# Patient Record
Sex: FEMALE | Race: WHITE | URBAN - METROPOLITAN AREA
[De-identification: names, ages, dates, MRNs, and addresses within clinical notes are randomized per-mention and may not be internally consistent; named-entity substitution may affect disease eponyms.]

---

## 2017-05-13 ENCOUNTER — HOSPITAL ENCOUNTER (EMERGENCY)
Facility: CLINIC | Age: 32
Discharge: PSYCHIATRIC OR SUBSTANCE USE FACILITY | End: 2017-05-13
Attending: EMERGENCY MEDICINE | Admitting: EMERGENCY MEDICINE
Payer: COMMERCIAL

## 2017-05-13 ENCOUNTER — HOSPITAL ENCOUNTER (INPATIENT)
Facility: CLINIC | Age: 32
LOS: 3 days | Discharge: HOME | DRG: 885 | End: 2017-05-16
Attending: PSYCHIATRY & NEUROLOGY | Admitting: PSYCHIATRY & NEUROLOGY
Payer: COMMERCIAL

## 2017-05-13 VITALS — RESPIRATION RATE: 16 BRPM | WEIGHT: 191 LBS | TEMPERATURE: 97 F | BODY MASS INDEX: 28.95 KG/M2 | HEIGHT: 68 IN

## 2017-05-13 VITALS
TEMPERATURE: 97.8 F | OXYGEN SATURATION: 98 % | HEART RATE: 105 BPM | DIASTOLIC BLOOD PRESSURE: 77 MMHG | SYSTOLIC BLOOD PRESSURE: 95 MMHG | RESPIRATION RATE: 16 BRPM

## 2017-05-13 DIAGNOSIS — T14.91XA SUICIDE ATTEMPT (H): ICD-10-CM

## 2017-05-13 DIAGNOSIS — T50.902A DRUG OVERDOSE, INTENTIONAL SELF-HARM, INITIAL ENCOUNTER (H): ICD-10-CM

## 2017-05-13 DIAGNOSIS — F41.9 ANXIETY: Primary | ICD-10-CM

## 2017-05-13 PROBLEM — F32.A DEPRESSION: Status: ACTIVE | Noted: 2017-05-13

## 2017-05-13 LAB
ALBUMIN SERPL-MCNC: 3.9 G/DL (ref 3.4–5)
ALP SERPL-CCNC: 30 U/L (ref 40–150)
ALT SERPL W P-5'-P-CCNC: 20 U/L (ref 0–50)
AMPHETAMINES UR QL SCN: ABNORMAL
ANION GAP SERPL CALCULATED.3IONS-SCNC: 7 MMOL/L (ref 3–14)
APAP SERPL-MCNC: NORMAL MG/L (ref 10–20)
AST SERPL W P-5'-P-CCNC: 22 U/L (ref 0–45)
BARBITURATES UR QL: ABNORMAL
BASOPHILS # BLD AUTO: 0 10E9/L (ref 0–0.2)
BASOPHILS NFR BLD AUTO: 0.5 %
BENZODIAZ UR QL: ABNORMAL
BILIRUB SERPL-MCNC: 0.5 MG/DL (ref 0.2–1.3)
BUN SERPL-MCNC: 19 MG/DL (ref 7–30)
CALCIUM SERPL-MCNC: 9.4 MG/DL (ref 8.5–10.1)
CANNABINOIDS UR QL SCN: ABNORMAL
CHLORIDE SERPL-SCNC: 108 MMOL/L (ref 94–109)
CO2 SERPL-SCNC: 29 MMOL/L (ref 20–32)
COCAINE UR QL: ABNORMAL
CREAT SERPL-MCNC: 0.88 MG/DL (ref 0.52–1.04)
DIFFERENTIAL METHOD BLD: NORMAL
EOSINOPHIL # BLD AUTO: 0.2 10E9/L (ref 0–0.7)
EOSINOPHIL NFR BLD AUTO: 2.4 %
ERYTHROCYTE [DISTWIDTH] IN BLOOD BY AUTOMATED COUNT: 13.8 % (ref 10–15)
ETHANOL SERPL-MCNC: <0.01 G/DL
GFR SERPL CREATININE-BSD FRML MDRD: 75 ML/MIN/1.7M2
GLUCOSE SERPL-MCNC: 80 MG/DL (ref 70–99)
HCG UR QL: NEGATIVE
HCT VFR BLD AUTO: 40.1 % (ref 35–47)
HGB BLD-MCNC: 13.4 G/DL (ref 11.7–15.7)
IMM GRANULOCYTES # BLD: 0 10E9/L (ref 0–0.4)
IMM GRANULOCYTES NFR BLD: 0.1 %
LYMPHOCYTES # BLD AUTO: 2.5 10E9/L (ref 0.8–5.3)
LYMPHOCYTES NFR BLD AUTO: 32.5 %
MCH RBC QN AUTO: 29.1 PG (ref 26.5–33)
MCHC RBC AUTO-ENTMCNC: 33.4 G/DL (ref 31.5–36.5)
MCV RBC AUTO: 87 FL (ref 78–100)
MONOCYTES # BLD AUTO: 0.6 10E9/L (ref 0–1.3)
MONOCYTES NFR BLD AUTO: 7.4 %
NEUTROPHILS # BLD AUTO: 4.3 10E9/L (ref 1.6–8.3)
NEUTROPHILS NFR BLD AUTO: 57.1 %
NRBC # BLD AUTO: 0 10*3/UL
NRBC BLD AUTO-RTO: 0 /100
OPIATES UR QL SCN: ABNORMAL
PCP UR QL SCN: ABNORMAL
PLATELET # BLD AUTO: 285 10E9/L (ref 150–450)
POTASSIUM SERPL-SCNC: 3.9 MMOL/L (ref 3.4–5.3)
PROT SERPL-MCNC: 7.8 G/DL (ref 6.8–8.8)
RBC # BLD AUTO: 4.6 10E12/L (ref 3.8–5.2)
SODIUM SERPL-SCNC: 144 MMOL/L (ref 133–144)
WBC # BLD AUTO: 7.6 10E9/L (ref 4–11)

## 2017-05-13 PROCEDURE — 80307 DRUG TEST PRSMV CHEM ANLYZR: CPT | Performed by: EMERGENCY MEDICINE

## 2017-05-13 PROCEDURE — 80053 COMPREHEN METABOLIC PANEL: CPT | Performed by: EMERGENCY MEDICINE

## 2017-05-13 PROCEDURE — 81025 URINE PREGNANCY TEST: CPT | Performed by: EMERGENCY MEDICINE

## 2017-05-13 PROCEDURE — 99285 EMERGENCY DEPT VISIT HI MDM: CPT | Mod: 25

## 2017-05-13 PROCEDURE — 25000132 ZZH RX MED GY IP 250 OP 250 PS 637: Performed by: PSYCHIATRY & NEUROLOGY

## 2017-05-13 PROCEDURE — 80329 ANALGESICS NON-OPIOID 1 OR 2: CPT | Performed by: EMERGENCY MEDICINE

## 2017-05-13 PROCEDURE — 90791 PSYCH DIAGNOSTIC EVALUATION: CPT

## 2017-05-13 PROCEDURE — 80320 DRUG SCREEN QUANTALCOHOLS: CPT | Performed by: EMERGENCY MEDICINE

## 2017-05-13 PROCEDURE — 93005 ELECTROCARDIOGRAM TRACING: CPT

## 2017-05-13 PROCEDURE — 12400001 ZZH R&B MH UMMC

## 2017-05-13 PROCEDURE — 85025 COMPLETE CBC W/AUTO DIFF WBC: CPT | Performed by: EMERGENCY MEDICINE

## 2017-05-13 RX ORDER — ACETAMINOPHEN 325 MG/1
650 TABLET ORAL EVERY 4 HOURS PRN
Status: DISCONTINUED | OUTPATIENT
Start: 2017-05-13 | End: 2017-05-16 | Stop reason: HOSPADM

## 2017-05-13 RX ORDER — DEXTROAMPHETAMINE SACCHARATE, AMPHETAMINE ASPARTATE MONOHYDRATE, DEXTROAMPHETAMINE SULFATE AND AMPHETAMINE SULFATE 7.5; 7.5; 7.5; 7.5 MG/1; MG/1; MG/1; MG/1
60 CAPSULE, EXTENDED RELEASE ORAL DAILY
Status: ON HOLD | COMMUNITY
End: 2017-12-11

## 2017-05-13 RX ORDER — ALUMINA, MAGNESIA, AND SIMETHICONE 2400; 2400; 240 MG/30ML; MG/30ML; MG/30ML
30 SUSPENSION ORAL EVERY 4 HOURS PRN
Status: DISCONTINUED | OUTPATIENT
Start: 2017-05-13 | End: 2017-05-16 | Stop reason: HOSPADM

## 2017-05-13 RX ORDER — TRAZODONE HYDROCHLORIDE 50 MG/1
50 TABLET, FILM COATED ORAL
Status: DISCONTINUED | OUTPATIENT
Start: 2017-05-13 | End: 2017-05-16 | Stop reason: HOSPADM

## 2017-05-13 RX ORDER — BISACODYL 10 MG
10 SUPPOSITORY, RECTAL RECTAL DAILY PRN
Status: DISCONTINUED | OUTPATIENT
Start: 2017-05-13 | End: 2017-05-16 | Stop reason: HOSPADM

## 2017-05-13 RX ORDER — OLANZAPINE 10 MG/1
10 TABLET ORAL
Status: DISCONTINUED | OUTPATIENT
Start: 2017-05-13 | End: 2017-05-16 | Stop reason: HOSPADM

## 2017-05-13 RX ORDER — ALBUTEROL SULFATE 90 UG/1
1-2 AEROSOL, METERED RESPIRATORY (INHALATION) EVERY 4 HOURS PRN
Status: DISCONTINUED | OUTPATIENT
Start: 2017-05-13 | End: 2017-05-16 | Stop reason: HOSPADM

## 2017-05-13 RX ORDER — NICOTINE 21 MG/24HR
1 PATCH, TRANSDERMAL 24 HOURS TRANSDERMAL DAILY
Status: DISCONTINUED | OUTPATIENT
Start: 2017-05-14 | End: 2017-05-16 | Stop reason: HOSPADM

## 2017-05-13 RX ORDER — HYDROXYZINE HYDROCHLORIDE 25 MG/1
25-50 TABLET, FILM COATED ORAL EVERY 4 HOURS PRN
Status: DISCONTINUED | OUTPATIENT
Start: 2017-05-13 | End: 2017-05-16 | Stop reason: HOSPADM

## 2017-05-13 RX ORDER — PHENOBARBITAL 32.4 MG/1
64.8 TABLET ORAL 2 TIMES DAILY
Status: DISCONTINUED | OUTPATIENT
Start: 2017-05-13 | End: 2017-05-15

## 2017-05-13 RX ORDER — MIRTAZAPINE 30 MG/1
30 TABLET, FILM COATED ORAL AT BEDTIME
Status: DISCONTINUED | OUTPATIENT
Start: 2017-05-13 | End: 2017-05-16 | Stop reason: HOSPADM

## 2017-05-13 RX ORDER — OLANZAPINE 10 MG/2ML
10 INJECTION, POWDER, FOR SOLUTION INTRAMUSCULAR
Status: DISCONTINUED | OUTPATIENT
Start: 2017-05-13 | End: 2017-05-16 | Stop reason: HOSPADM

## 2017-05-13 RX ORDER — ARIPIPRAZOLE 10 MG/1
10 TABLET ORAL AT BEDTIME
Status: DISCONTINUED | OUTPATIENT
Start: 2017-05-13 | End: 2017-05-16 | Stop reason: HOSPADM

## 2017-05-13 RX ORDER — ZOLPIDEM TARTRATE 5 MG/1
10 TABLET ORAL
Status: DISCONTINUED | OUTPATIENT
Start: 2017-05-13 | End: 2017-05-16 | Stop reason: HOSPADM

## 2017-05-13 RX ADMIN — MIRTAZAPINE 30 MG: 30 TABLET, FILM COATED ORAL at 23:03

## 2017-05-13 RX ADMIN — PHENOBARBITAL 64.8 MG: 32.4 TABLET ORAL at 23:03

## 2017-05-13 RX ADMIN — ZOLPIDEM TARTRATE 10 MG: 5 TABLET, FILM COATED ORAL at 23:04

## 2017-05-13 RX ADMIN — ARIPIPRAZOLE 10 MG: 10 TABLET ORAL at 23:03

## 2017-05-13 NOTE — IP AVS SNAPSHOT
MRN:[id number]                      After Visit Summary   5/13/2017    Marialuisa Streeter    MRN: [id number]           Thank you!     Thank you for choosing [facility] for your care. Our goal is always to provide you with excellent care.        Patient Information     Date Of Birth          1985        Designated Caregiver       Most Recent Value    Caregiver    Will someone help with your care after discharge? yes      About your hospital stay     You were admitted on:  May 13, 2017 You last received care in the:  [facility]    You were discharged on:  May 16, 2017       Who to Call     For medical emergencies, please call 911.  For non-urgent questions about your medical care, please call your primary care provider or clinic, None          Attending Provider     Provider Azeem Parra MD Psychiatry       Primary Care Provider    None       No address on file        Further instructions from your care team       Behavioral Discharge Planning and Instructions    Summary: You were admitted to station 10 due to suicidal statements. During your hospitalization, you met daily with the staff and were encouraged to attend all therapeutic programming. You met with the Clinical Treatment Coordinator and participated in your discharge planning. You are now stabilized and are discharged home.  Referrals and recommendations are listed below.  Primary Diagnosis:  Major Depressive Disorder     Major Treatments, Procedures and Findings: The patient participated in the therapeutic milieu and groups. The patient learned and practiced positive coping strategies. The patient was assessed for mental health and medication needs.  Medications were adjusted based on the identified needs.    Symptoms to Report: feeling more aggressive, increased confusion, losing more sleep, mood getting worse or thoughts of suicide    Lifestyle Adjustment: Adjust your lifestyle to get enough sleep, relaxation,  exercise and  good nutrition. Continue to develop healthy coping skills to decrease stress and promote a healthy living environment. No use of alcohol, illegal drugs or addictive medications other than what is currently prescribed. Attend all your appointments and take your medications as prescribed.    Psychiatry Follow-up:     [facility]   [address] [place] [address]  Phone: [phone]  Fax: [phone]    Psychiatrist: Aidee Conklin   *You have a follow-up medication management appointment with Aidee Conklin scheduled for Friday, June 2nd at 10:00 am   Therapist: Mojgan Be  *You have an initial intake for individual therapy with Mojgan Be scheduled for Monday, July 10th at 9:20 am     Resources:   Crisis Intervention: 655.120.4625 or 118-226-4182 (TTY: 455.167.2616).  Call anytime for help.  National [place] on Mental Illness (www.[place].chrissie.org): 499.139.2365 or 538-411-9826.  Alcoholics Anonymous (www.alcoholics-anonymous.org): Check your phone book for your local chapter.  Suicide Awareness Voices of Education (SAVE) (www.save.org): 093-694-XRFH (8446)  National Suicide Prevention Line (www.mentalhealthmn.org): 846-314-LEKC (7283)  Mental Health Consumer/Survivor Network of [place] (www.mhcsn.net): 765.356.7261 or 092-598-3656  Mental Health Association of [place] (www.mentalhealth.org): 411.640.9071 or 438-250-5127    General Medication Instructions:   See your medication sheet(s) for instructions.   Take all medicines as directed.  Make no changes unless your doctor suggests them.   Go to all your doctor visits.  Be sure to have all your required lab tests. This way, your medicines can be refilled on time.  Do not use any drugs not prescribed by your doctor.  Avoid alcohol.    The treatment team has appreciated the opportunity to work with you.  We wish you the best in the future. If you have any questions or concerns our unit number is [phone].  "    Pending Results     No orders found from 2017 to 2017.            Admission Information     Date & Time Provider Department Dept. Phone    2017 Azeem Ch MD [address] [phone]      Your Vitals Were     Temperature Respirations Height Weight BMI (Body Mass Index)       97  F (36.1  C) (Oral) 16 1.727 m (5' 8\") 86.6 kg (191 lb) 29.04 kg/m2       Ad[employer] Information     [employer] lets you send messages to your doctor, view your test results, renew your prescriptions, schedule appointments and more. To sign up, go to [email or web address] . Click on \"Log in\" on the left side of the screen, which will take you to the Welcome page. Then click on \"Sign up Now\" on the right side of the page.     You will be asked to enter the access code listed below, as well as some personal information. Please follow the directions to create your username and password.     Your access code is: [email or web address]  Expires: 2017  8:54 AM     Your access code will  in 90 days. If you need help or a new code, please call your [facility] [place] clinic or [phone].        Care EveryWhere ID     This is your Care EveryWhere ID. This could be used by other organizations to access your [facility] [place] medical records  [id number]           Review of your medicines      START taking        Dose / Directions    hydrOXYzine 25 MG tablet   Commonly known as:  ATARAX   Used for:  Anxiety        Dose:  25-50 mg   Take 1-2 tablets (25-50 mg) by mouth 3 times daily as needed for anxiety   Quantity:  30 tablet   Refills:  0         CONTINUE these medicines which have NOT CHANGED        Dose / Directions    amphetamine-dextroamphetamine 30 MG per 24 hr capsule   Commonly known as:  ADDERALL XR        Dose:  60 mg   Take 60 mg by mouth daily   Refills:  0       ARIPIPRAZOLE PO        Dose:  10 mg   Take 10 mg by mouth At Bedtime   Refills:  0       NASONEX 50 MCG/ACT spray   Used for:  Unspecified otitis media   Generic drug:  " mometasone        INHALE 2 SPRAYS IN EACH NOSTRIL ONE DAILY   Quantity:  1   Refills:  0       NUVARING VA        None Entered   Refills:  0       REMERON PO        Dose:  30 mg   Take 30 mg by mouth At Bedtime   Refills:  0       ZOLPIDEM TARTRATE PO        Dose:  10 mg   Take 10 mg by mouth nightly as needed for sleep   Refills:  0         STOP taking     ALPRAZOLAM PO           MACROBID 100 MG capsule   Generic drug:  nitrofurantoin (macrocrystal-monohydrate)                Where to get your medicines      These medications were sent to [facility] [place], [place] - [address]  [address], [facility] [address]     Phone:  [phone]     hydrOXYzine 25 MG tablet                Protect others around you: Learn how to safely use, store and throw away your medicines at www.disposemymeds.org.             Medication List: This is a list of all your medications and when to take them. Check marks below indicate your daily home schedule. Keep this list as a reference.      Medications           Morning Afternoon Evening Bedtime As Needed    amphetamine-dextroamphetamine 30 MG per 24 hr capsule   Commonly known as:  ADDERALL XR   Take 60 mg by mouth daily                                ARIPIPRAZOLE PO   Take 10 mg by mouth At Bedtime   Last time this was given:  10 mg on 5/15/2017  8:22 PM                                hydrOXYzine 25 MG tablet   Commonly known as:  ATARAX   Take 1-2 tablets (25-50 mg) by mouth 3 times daily as needed for anxiety   Last time this was given:  25 mg on 5/16/2017  9:48 AM                                NASONEX 50 MCG/ACT spray   INHALE 2 SPRAYS IN EACH NOSTRIL ONE DAILY   Generic drug:  mometasone                                NUVARING VA   None Entered                                REMERON PO   Take 30 mg by mouth At Bedtime   Last time this was given:  30 mg on 5/15/2017  8:23 PM                                ZOLPIDEM TARTRATE PO   Take 10 mg by mouth  nightly as needed for sleep   Last time this was given:  10 mg on 5/15/2017  8:22 PM

## 2017-05-13 NOTE — ED NOTES
Bed: [facility]  Expected date: 5/13/17  Expected time: 2:59 PM  Means of arrival: Ambulance  Comments:  [age]f suicidal ideation

## 2017-05-13 NOTE — IP AVS SNAPSHOT
[address] [address]    Phone:  [phone]                                       After Visit Summary   5/13/2017    Marialuisa Streeter    MRN: [id number]           After Visit Summary Signature Page     I have received my discharge instructions, and my questions have been answered. I have discussed any challenges I see with this plan with the nurse or doctor.    ..........................................................................................................................................  Patient/Patient Representative Signature      ..........................................................................................................................................  Patient Representative Print Name and Relationship to Patient    ..................................................               ................................................  Date                                            Time    ..........................................................................................................................................  Reviewed by Signature/Title    ...................................................              ..............................................  Date                                                            Time

## 2017-05-14 PROCEDURE — 12400001 ZZH R&B MH UMMC

## 2017-05-14 PROCEDURE — 25000132 ZZH RX MED GY IP 250 OP 250 PS 637: Performed by: PSYCHIATRY & NEUROLOGY

## 2017-05-14 PROCEDURE — 99223 1ST HOSP IP/OBS HIGH 75: CPT | Mod: AI | Performed by: PSYCHIATRY & NEUROLOGY

## 2017-05-14 RX ORDER — DEXTROAMPHETAMINE SACCHARATE, AMPHETAMINE ASPARTATE MONOHYDRATE, DEXTROAMPHETAMINE SULFATE AND AMPHETAMINE SULFATE 7.5; 7.5; 7.5; 7.5 MG/1; MG/1; MG/1; MG/1
60 CAPSULE, EXTENDED RELEASE ORAL EVERY MORNING
Status: DISCONTINUED | OUTPATIENT
Start: 2017-05-15 | End: 2017-05-16 | Stop reason: HOSPADM

## 2017-05-14 RX ADMIN — PHENOBARBITAL 64.8 MG: 32.4 TABLET ORAL at 11:06

## 2017-05-14 RX ADMIN — NICOTINE 1 PATCH: 21 PATCH, EXTENDED RELEASE TRANSDERMAL at 11:06

## 2017-05-14 RX ADMIN — MIRTAZAPINE 30 MG: 30 TABLET, FILM COATED ORAL at 21:08

## 2017-05-14 RX ADMIN — PHENOBARBITAL 64.8 MG: 32.4 TABLET ORAL at 21:08

## 2017-05-14 RX ADMIN — ALBUTEROL SULFATE 2 PUFF: 90 AEROSOL, METERED RESPIRATORY (INHALATION) at 00:27

## 2017-05-14 RX ADMIN — ARIPIPRAZOLE 10 MG: 10 TABLET ORAL at 21:08
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
## 2017-05-14 NOTE — PLAN OF CARE
Problem: General Plan of Care (Inpatient Behavioral)  Goal: Team Discussion  Team Plan:   BEHAVIORAL TEAM DISCUSSION     Continued Stay Criteria/Rationale: Patient is newly admitted after intentional overdose in the context of argument with S.O. Evaluation in process.  Plan: Provide a safe environment and therapeutic milieu. Encourage participation in unit programming. Develop appropriate aftercare plan.  Participants: Clarice TILLMAN; Caryn Hoffman RN  Summary/Recommendation: psychiatric evaluation; schedule aftercare appointments.   Medical/Physical: stable  Progress: improving     Illness Management Recovery model: Personal Plan of Care     Patient has not yet completed Personal Plan of Care
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
## 2017-05-14 NOTE — PROGRESS NOTES
05/13/17 2134   Patient Belongings   Did you bring any home meds/supplements to the hospital?  Yes   Disposition of meds  Sent to security/pharmacy per site process   Patient Belongings cell phone/electronics;clothing;purse;wallet;bracelet;shoes   Disposition of Belongings meds, cards, checks to security. other belongings in locker   Belongings Search Yes   Clothing Search Yes   Second Staff [place]   General Info Comment [employer] checks [phone], [employer] credit card, [employer] credit card, [employer] express business card, discover it card, [employer] credit card, fidelity mastercard visa sent to security envelope [phone]     ADMISSION:  I am responsible for any personal items that are not sent to the safe or pharmacy. [facility] is not responsible for loss, theft or damage of any property in my possession.    Patient Signature _____________________ Date/Time _____________________    Staff Signature _______________________ Date/Time _____________________    2nd Staff person, if patient is unable/unwilling to sign  ___________________________________ Date/Time _____________________    DISCHARGE:  My personal items have been returned to me.   Patient Signature _____________________ Date/Time _____________________
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
## 2017-05-14 NOTE — H&P
"DATE OF SERVICE:  05/14/2017      The patient was seen for 70 minutes on 05/14/2017.  Greater than 50% of this time was spent on counseling and coordinating care, clarifying diagnostic prognostic issues and presence of some support in community.      CHIEF COMPLAINT AND REASON FOR ADMISSION:  Marialuisa Streeter is a 32-year-old  female transferred from [facility] after overdose on Xanax.  The patient is on a 72-hour hold.      HISTORY OF PRESENT ILLNESS:  The patient presents as minimally reliable historian.  She clearly is guarded and focused on being discharged as soon as possible, minimizes her depressive symptoms.  According to collateral sources, the patient had a  hold after the patient took large amount of alprazolam, 90 count was filled on 05/08, has zero left in the bottle.  The patient said that when she got released from the hospital that she would kill herself.  The patient herself said that she had attempted to ingest a handful of her Xanax but boyfriend knocked them out of her hand.  She believes that she took only 15 or so.  Computer records  state that her boyfriend told her that he was leaving her and the patient responded that she told boyfriend \"if you leave again I will suicide by taking my Xanax.\"  The patient said that boyfriend said \"go ahead and take them, I don't care.\"  The patient told me during the interview today that she did not feel depressed, had normal sleep and appetite; however, collateral sources contradicted that and symptoms of depression identified as frequent crying, increased appetite with weight gain, thoughts of death.  The patient identified stressors of her boyfriend, job and family leading to depressed mood.  She said the boyfriend is perpetually leaving her, she does not enjoy her job, family does not like being around her.  The patient stated that last suicidal ideation was 2 weeks prior to this admission, last time that " "boyfriend broke up with her.      PAST PSYCHIATRIC HISTORY:  She states that she was not hospitalized overnight, but was [place] [facility] [place] however, computer records states that she was hospitalized 3 times, this last hospitalization in 04/2016 at [facility] [place].  She is followed by a nurse practitioner, Aidee Conklin with [facility], reports that she has been treated for anxiety with Xanax, for ADHD with Adderall, takes Remeron for insomnia and depression.  The patient does not have a therapist, stating the last time it made things worse.  Her primary medical doctor Lorin Rush with [facility] in [place].  The patient in the past reported 2 previous attempts of suicide by cutting and sitting on a bridge.  Today, she told me that she never sat on the bridge with this plan to kill herself.  She reports starting cutting when she was a teenager.  said last time she cut was 3 years ago.  The patient reports that she used to be in DBT, but did not like it, stating \"it was redundant.\"      PAST MEDICAL HISTORY:  Recent suicide attempt by Xanax overdose.        ALLERGIES:   Allergic to amoxicillin.       HOME MEDICATIONS:     1.  Remeron 30 mg daily at bedtime.   2.  Adderall 60 mg extended release in the morning.   3.  Alprazolam 0.5 to 1 mg by mouth 2 times a day as needed for anxiety.   4.  Ambien 10 mg at bedtime as needed for sleep.   5.  Abilify 10 mg at night.      FAMILY AND SOCIAL HISTORY:  The patient is single, has no children.  Has 2 jobs.  She has been in current relationship, which she said to be strained for a year and a half.  She has an older sister, 1 younger brother.  She was raised in the [place], went to [employer]  but did not get a degree.  She denied any history of physical, sexual or emotional abuse.  Denied any family history of mental illness, states that she drinks very rarely.  Urine drug screen was positive for amphetamines and benzodiazepines.  The " "patient has been prescribed both substances.      VITAL SIGNS:  Temperature 97, heart rate 93, blood pressure 104/66, respirations 16      REVIEW OF SYSTEMS:  A 12-point review is positive for mental health, otherwise negative.      PHYSICAL EXAMINATION:  Please refer to Dr. Wesley's emergency room note from 05/13/2017.      MENTAL STATUS EXAMINATION:  The patient is a  female with dyed hair who appears to be guarded focused on discharge.  She maintained fair eye contact.  She sounded irritable.  She frequently yawns during the interview and asks to repeat questions.  She clearly minimizes her depressive symptoms, however, looks depressed.  She stated \"I'm not suicidal now, I can be discharged.  There is not too much you can do for me here.\"  There is no evidence of homicidal thoughts, hypomania or ruthy, psychosis.  Thought processes were sequential and goal directed.  She is alert and oriented x3.  Fund of knowledge was average with proper usage of vocabulary.  Ability to focus and concentrate, immediate short and long-term memories are all intact.  I would describe her insight as limited, judgment moderately impaired.  There were no abnormal involuntary movements noted during this interview.  Patient's motor stance and gait are within normal limits.      IMPRESSION:  Major depressive disorder, recurrent, moderate severity.  Generalized anxiety disorder.  Borderline personality disorder.      TREATMENT PLAN:  The patient presented after serious suicide attempt.  She shows little insight and poor judgment, clearly minimized her depressive symptoms.  She has strained relationship with significant other.  There is a high chance of another suicide attempt if she is discharged today as she demanded.  I felt it appropriate to continue her on 72-hour hold.  I discussed with her medication changes.  She stated that she would not change any antidepressants here and would only do that with her outpatient " psychiatric provider, Aidee Conklin from [facility].  She reluctantly agreed to start seeing a counselor but firmly refused to even consider going into partial hospitalization program citing the need to work.  We discussed that she is on Xanax she uses for anxiety and strong stimulants such as Adderall.  She stated that she would like to continue to be on Adderall because it was medication prescribed for her for many years for ADHD.  I am going to put her back on Adderall; however, Xanax that she overdosed on was discontinued.  She was put on phenobarbital to prevent benzodiazepine withdrawal instead.  The patient's care will be taken over on Monday by Dr. Ch.         MAYELA ALEJO MD             D: 2017 15:40   T: 2017 16:36   MT: BIRD      Name:     EMMANUEL NAYLOR   MRN:      [id number]        Account:      [id number]   :      1985           Admitted:     [id number]      Document: [id number]

## 2017-05-15 PROCEDURE — 25000132 ZZH RX MED GY IP 250 OP 250 PS 637: Performed by: PSYCHIATRY & NEUROLOGY

## 2017-05-15 PROCEDURE — 12400001 ZZH R&B MH UMMC

## 2017-05-15 PROCEDURE — 99232 SBSQ HOSP IP/OBS MODERATE 35: CPT | Performed by: PSYCHIATRY & NEUROLOGY

## 2017-05-15 RX ORDER — PHENOBARBITAL 32.4 MG/1
32.4 TABLET ORAL 2 TIMES DAILY
Status: DISCONTINUED | OUTPATIENT
Start: 2017-05-15 | End: 2017-05-16 | Stop reason: HOSPADM

## 2017-05-15 RX ADMIN — ARIPIPRAZOLE 10 MG: 10 TABLET ORAL at 20:22

## 2017-05-15 RX ADMIN — NICOTINE 1 PATCH: 21 PATCH, EXTENDED RELEASE TRANSDERMAL at 09:03

## 2017-05-15 RX ADMIN — HYDROXYZINE HYDROCHLORIDE 50 MG: 25 TABLET ORAL at 17:15

## 2017-05-15 RX ADMIN — PHENOBARBITAL 32.4 MG: 32.4 TABLET ORAL at 20:22

## 2017-05-15 RX ADMIN — MIRTAZAPINE 30 MG: 30 TABLET, FILM COATED ORAL at 20:23

## 2017-05-15 RX ADMIN — ZOLPIDEM TARTRATE 10 MG: 5 TABLET, FILM COATED ORAL at 20:22

## 2017-05-15 RX ADMIN — PHENOBARBITAL 64.8 MG: 32.4 TABLET ORAL at 09:02

## 2017-05-15 ASSESSMENT — ACTIVITIES OF DAILY LIVING (ADL)
DRESS: SCRUBS (BEHAVIORAL HEALTH)
ORAL_HYGIENE: INDEPENDENT
GROOMING: INDEPENDENT
LAUNDRY: WITH SUPERVISION

## 2017-05-15 NOTE — PROGRESS NOTES
"[facility], [place]   Psychiatric Progress Note         Interim History and Subjective Reports:   The patient's care was discussed with the treatment team during the daily team meeting.  Staff reports: no acute issues    Depression severity scale 0-10 (10=most severe):  Today: 2  She reports that she and her boyfriend have made contact and resolve the conflict which prompted her overdose and hospitalization. She plans to stay with her mother after discharge as they continue to work on improving the relationship.    Patient denies SI/HI.  Patient denies psychosis/AH/VH./paranoia.  Sleep, energy, appetite, and concentration are reported as fair-good.  Tolerating medications well without significant side effects     She asked if she could go home today.           Scheduled Medications:       amphetamine-dextroamphetamine  60 mg Oral QAM     ARIPiprazole (ABILIFY) tablet 10 mg  10 mg Oral At Bedtime     mirtazapine (REMERON) tablet 30 mg  30 mg Oral At Bedtime     nicotine   Transdermal Q8H     nicotine   Transdermal Daily     nicotine  1 patch Transdermal Daily     PHENobarbital  64.8 mg Oral BID          Allergies:      Allergies   Allergen Reactions     Amoxicillin Hives          Labs:   No results found for this or any previous visit (from the past 24 hour(s)).       Vitals:   Temp 97  F (36.1  C) (Oral)  Resp 16  Ht 1.727 m (5' 8\")  Wt 86.6 kg (191 lb)  BMI 29.04 kg/m2  Weight: 191 lbs 0 oz          Height: 5' 8\"           Body mass index is 29.04 kg/(m^2).        Mental Status Examination:   Appearance: awake, alert  Attitude:  cooperative  Eye Contact:  fair  Mood:  better  Affect:  appropriate and in normal range  Speech:  clear, coherent  Psychomotor Behavior:  no evidence of tardive dyskinesia, dystonia, or tics  Throught Process:  logical and linear  Associations:  no loose associations  Thought Content:  no evidence of suicidal ideation or homicidal ideation and no evidence " of psychotic thought  Insight:  fair  Judgement:  intact  Oriented to:  time, person, and place  Attention Span and Concentration:  fair  Recent and Remote Memory:  intact         DIagnoses:     Major depressive disorder, recurrent, moderate severity.   Generalized anxiety disorder. Borderline personality disorder.         Plan:   1. Biological treatments:  -- Continue current medications    2. Psychosocial treatments:   --addressed with SW consult and groups    3. Dispo:  --anticipate discharge home tomorrow

## 2017-05-15 NOTE — PLAN OF CARE
Problem: General Plan of Care (Inpatient Behavioral)  Goal: Individualization/Patient Specific Goal (IP Behavioral)  The patient and/or their representative will achieve their patient-specific goals related to the plan of care.    The patient-specific goals include:   Personal Plan of Care     Reasons you are in the Hospital  1. Threatened suicide   2. Argument with boyfriend   3.  4.     Goals for Discharge   1. Support system   2. Stable home   3. Better communication skills

## 2017-05-15 NOTE — PROGRESS NOTES
scheduled the following outpatient psychiatry and an initial individual therapy appointments with [facility], information has been added to AVS:  [facility]   [address] [place] [address]  Phone: [phone]  Fax: [phone]    Psychiatrist: Aidee Conklin   *You have a follow-up medication management appointment with Aidee Conklin scheduled for Friday, June 2nd at 10:00 am   Therapist: Mojgan Be  *You have an initial intake for individual therapy with Mojgan Be scheduled for Monday, July 10th at 9:20 am

## 2017-05-15 NOTE — DISCHARGE INSTRUCTIONS
Behavioral Discharge Planning and Instructions    Summary: You were admitted to station 10 due to suicidal statements. During your hospitalization, you met daily with the staff and were encouraged to attend all therapeutic programming. You met with the Clinical Treatment Coordinator and participated in your discharge planning. You are now stabilized and are discharged home.  Referrals and recommendations are listed below.  Primary Diagnosis:  Major Depressive Disorder     Major Treatments, Procedures and Findings: The patient participated in the therapeutic milieu and groups. The patient learned and practiced positive coping strategies. The patient was assessed for mental health and medication needs.  Medications were adjusted based on the identified needs.    Symptoms to Report: feeling more aggressive, increased confusion, losing more sleep, mood getting worse or thoughts of suicide    Lifestyle Adjustment: Adjust your lifestyle to get enough sleep, relaxation, exercise and  good nutrition. Continue to develop healthy coping skills to decrease stress and promote a healthy living environment. No use of alcohol, illegal drugs or addictive medications other than what is currently prescribed. Attend all your appointments and take your medications as prescribed.    Psychiatry Follow-up:     [facility]   [address] [place] [address]  Phone: [phone]  Fax: [phone]    Psychiatrist: Aidee Conklin   *You have a follow-up medication management appointment with Aidee Conklin scheduled for Friday, June 2nd at 10:00 am   Therapist: Mojgan Be  *You have an initial intake for individual therapy with Mojgan Be scheduled for Monday, July 10th at 9:20 am     Resources:   Crisis Intervention: [phone] or 210-036-5519 (TTY: 549.972.9400).  Call anytime for help.  National [place] on Mental Illness (www.[place].chrissie.org): 486.933.1970 or 835-226-0750.  Alcoholics  Anonymous (www.alcoholics-anonymous.org): Check your phone book for your local chapter.  Suicide Awareness Voices of Education (SAVE) (www.save.org): 803-760-DSYN (9334)  National Suicide Prevention Line (www.mentalhealthmn.org): 268-087-GYCX (2002)  Mental Health Consumer/Survivor Network of [place] (www.mhcsn.net): 762.708.9732 or 534-549-4734  Mental Health Association of [place] (www.mentalhealth.org): 244.437.8391 or 137-467-7965    General Medication Instructions:   See your medication sheet(s) for instructions.   Take all medicines as directed.  Make no changes unless your doctor suggests them.   Go to all your doctor visits.  Be sure to have all your required lab tests. This way, your medicines can be refilled on time.  Do not use any drugs not prescribed by your doctor.  Avoid alcohol.    The treatment team has appreciated the opportunity to work with you.  We wish you the best in the future. If you have any questions or concerns our unit number is [phone].

## 2017-05-15 NOTE — PROGRESS NOTES
"Pt was isolative to her room during all of this shift, except during meal and snack times. Pt denies SI/SIB, rates depression 0/10, and rates anxiety 4/10. Pt reports being hopeful to discharge tomorrow, \"I'm anxious to leave. I feel all cooped up.\" Pt has no concerns to report at this time.       05/15/17 8363   Behavioral Health   Hallucinations denies / not responding to hallucinations   Thinking distractable   Orientation person: oriented;place: oriented;date: oriented   Memory baseline memory   Insight poor   Judgement impaired   Eye Contact at examiner   Affect blunted, flat   Mood mood is calm   Physical Appearance/Attire attire appropriate to age and situation   Hygiene other (see comment)  (Fair)   Suicidality other (see comments)  (Denies)   Self Injury other (see comment)  (Denies)   Activity isolative   Speech clear;coherent   Medication Sensitivity no stated side effects   Psychomotor / Gait balanced;steady   Psycho Education   Type of Intervention 1:1 intervention   Response participates, initiates socially appropriate   Hours 0.5   Treatment Detail (Triggers, MH Check-in)   Activities of Daily Living   Hygiene/Grooming independent   Oral Hygiene independent   Dress scrubs (behavioral health)   Laundry with supervision   Room Organization independent   Behavioral Health Interventions   Depression maintain safety precautions;monitor need to revise level of observation;maintain safe secure environment;provide emotional support;establish therapeutic relationship   Social and Therapeutic Interventions (Depression) encourage effective boundaries with peers;encourage participation in therapeutic groups and milieu activities;encourage socialization with peers     "

## 2017-05-15 NOTE — PLAN OF CARE
Problem: General Plan of Care (Inpatient Behavioral)  Goal: Individualization/Patient Specific Goal (IP Behavioral)  The patient and/or their representative will achieve their patient-specific goals related to the plan of care.    The patient-specific goals include:   Outcome: No Change  Illness Management Recovery model:  Triggers.      Patient identified the following triggers which may exacerbate their illness:     1. Getting into fights or disagreements  2. na  3. na

## 2017-05-15 NOTE — PLAN OF CARE
"Problem: Depressive Symptoms  Goal: Depressive Symptoms  Signs and symptoms of listed problems will be absent or manageable.   Outcome: No Change  Pt states she wants to discharge.  Pt also states she plans on staying in bed except for medication administration and meals.  Denies SI and SIB. Did not attend group. Rates depression 1-2/10 and anxiety 1-2/10. Denies psychotic symptoms. Concentration is \"good\" and denies racing thoughts. Pt is hopeful. States appetite is \"ok\" and sleep is \"good\". Denies pain or side effects. Pt states she can live with her parents when she discharges.  She plans on using her DBT skills. She also states she will go \"for a walk\" if she has any triggers which include getting into fights or disagreements. Pt refused her adderall today and states she will not take it while in hospital.        "

## 2017-05-15 NOTE — PROGRESS NOTES
Writer met with Pt, discussed reasons for admission and discharge plans. Pt signed MARLEY for psychiatry provider, and is open to therapy.

## 2017-05-15 NOTE — PROGRESS NOTES
05/14/17 2153   Behavioral Health   Hallucinations denies / not responding to hallucinations   Thinking distractable   Orientation place: oriented;person: oriented;time: oriented;date: oriented   Memory baseline memory   Insight poor   Judgement impaired   Eye Contact at examiner   Affect irritable;blunted, flat   Mood depressed;irritable   Physical Appearance/Attire appears stated age;attire appropriate to age and situation   Hygiene neglected grooming - unclean body, hair, teeth   Suicidality other (see comments)  (denies currently)   Self Injury other (see comment)  (denies currently)   Activity withdrawn;isolative   Speech clear;coherent   Medication Sensitivity no observed side effects;no stated side effects   Psychomotor / Gait balanced;steady     Pt was isolative to her room. Pt remained in bed all shift. Pt's mood appeared depressed and irritable and affect is flat. Pt expressed desire to be discharged as soon as possible. No psychotic symptoms observed or reported. No behavioral concerns this shift.

## 2017-05-16 ENCOUNTER — APPOINTMENT (OUTPATIENT)
Dept: BEHAVIORAL HEALTH | Facility: CLINIC | Age: 32
End: 2017-05-16
Attending: PSYCHIATRY & NEUROLOGY
Payer: COMMERCIAL

## 2017-05-16 PROCEDURE — 25000132 ZZH RX MED GY IP 250 OP 250 PS 637: Performed by: PSYCHIATRY & NEUROLOGY

## 2017-05-16 PROCEDURE — 97150 GROUP THERAPEUTIC PROCEDURES: CPT | Mod: GO

## 2017-05-16 PROCEDURE — 99239 HOSP IP/OBS DSCHRG MGMT >30: CPT | Performed by: PSYCHIATRY & NEUROLOGY

## 2017-05-16 PROCEDURE — 99212 OFFICE O/P EST SF 10 MIN: CPT

## 2017-05-16 RX ORDER — HYDROXYZINE HYDROCHLORIDE 25 MG/1
25-50 TABLET, FILM COATED ORAL 3 TIMES DAILY PRN
Qty: 30 TABLET | Refills: 0 | Status: ON HOLD | OUTPATIENT
Start: 2017-05-16 | End: 2017-12-11

## 2017-05-16 RX ADMIN — NICOTINE 1 PATCH: 21 PATCH, EXTENDED RELEASE TRANSDERMAL at 09:48

## 2017-05-16 RX ADMIN — HYDROXYZINE HYDROCHLORIDE 25 MG: 25 TABLET ORAL at 09:48

## 2017-05-16 RX ADMIN — OLANZAPINE 10 MG: 10 TABLET, FILM COATED ORAL at 12:52

## 2017-05-16 NOTE — DISCHARGE SUMMARY
[facility]  Department of Psychiatry    DATE OF ADMISSION:  5/13/2017    DATE OF DISCHARGE:  May 16, 2017    DISCHARGE DIAGNOSES:   Major depressive disorder, recurrent, moderate severity.   Generalized anxiety disorder. Borderline personality disorder.    HOSPITAL COURSE: (Refer to H&P, progress notes, and consult notes for details)     Patient was monitored under 72 hour hold however did not meet criteria to pursue involuntary commitment. She was willing to continue taking her medications, was able to maintain safety during her hospitalization, denied suicidal thoughts, and was able to address the stressor which precipitated her hospitalization and recent suicide attempt. Noting her improvements, her care was transition to outpatient providers as the patient did not desire continuing her hospitalization voluntarily. Hydroxyzine had been utilized as needed for anxiety which she found to be effective.    Mood and anxiety-related symptoms had improved by the time of discharge and the patient denied suicidal and homicidal thoughts, plans, or intent.  Treatment team felt the patient was stable and ready for discharge.    No restraints or seclusions were required during their hospitalization.  No allergies or severe adverse reactions to the medications were noted.    Other interventions received during his hospitalization included:   Psychosocial treatments were addressed with groups, social work consult, and supportive milieu provided by staff.    CONDITION AT DISCHARGE:  Improved.  The patients acute suicide risk is low due to the following factors:  improved mood/anxiety symptoms.  Denies suicidal ideations. Denies psychotic symptoms.  Not actively intoxicated and plans to abstain from illicit substances and alcohol.  Denies access to guns.  Denies feeling hopeless or helpless. At the time of discharge Marialuisa Streeter was determined to not be an immediate danger to herself  or others. The patient's acute risk will be higher if noncompliant with treatment plan, medications, follow-up or using illicit substances or alcohol.  These findings along with the risks of noncompliance with medications and treatment plan, which could potentially cause decompensation and increase the risk for suicide, were discussed with the patient.  The patients chronic suicide risk is moderate given the following factors: past suicide attempts; white race; diagnosis of MDD, Denied a family history of suicide.  Preventative factors include: social supports, stable housing     MENTAL STATUS EXAMINATION AT TIME OF DISCHARGE:  The patient is 32 year old White female who appears their stated age and is appropriately dressed with good hygiene.  Calm and cooperative with the interview questions.  No psychomotor abnormalities are noted. Eye contact is appropriate. Speech has normal rate, tone, latency and volume and is not pushed or pressured. Mood is euthymic and affect is full and appropriate.  The patient does not seem overtly depressed, anxious, manic or irritable.  Thought process is linear, logical and future oriented.  Thought process is not tangential, circumstantial or disorganized.  Thought content is not significant for apperant paranoia, delusions, ideas of reference or grandiosity.  The patient denies suicidal and homicidal ideations as well as auditory and visual hallucinations.  Insight and judgment are fair.  Cognition appears intact to interviewing including orientation, recent and remote memory, fund of knowledge, use of language, attention span and concentration.  Muscle strength, tone and gait appear normal on visual inspection.      DISPOSITION:  The patient is discharged home     FOLLOWUP APPOINTMENTS:  ( per social workers notes and after visit summary)  1.  Psychiatric follow-up through the [facility] June 2 and individual psychotherapy July 10    DISCHARGE MEDICATIONS:   Current  Discharge Medication List      START taking these medications    Details   hydrOXYzine (ATARAX) 25 MG tablet Take 1-2 tablets (25-50 mg) by mouth 3 times daily as needed for anxiety  Qty: 30 tablet, Refills: 0    Associated Diagnoses: Anxiety         CONTINUE these medications which have NOT CHANGED    Details   Mirtazapine (REMERON PO) Take 30 mg by mouth At Bedtime      amphetamine-dextroamphetamine (ADDERALL XR) 30 MG per 24 hr capsule Take 60 mg by mouth daily      ARIPIPRAZOLE PO Take 10 mg by mouth At Bedtime       ZOLPIDEM TARTRATE PO Take 10 mg by mouth nightly as needed for sleep      NUVARING VA None Entered      NASONEX 50 MCG/ACT NA SUSP INHALE 2 SPRAYS IN EACH NOSTRIL ONE DAILY  Qty: 1, Refills: 0    Associated Diagnoses: Unspecified otitis media         STOP taking these medications       ALPRAZOLAM PO Comments:   Reason for Stopping:         MACROBID 100 MG OR CAPS Comments:   Reason for Stopping:                LABORATORY RESULTS: (past 14 days)  Recent Results (from the past 336 hour(s))   CBC with platelets + differential    Collection Time: 05/13/17  3:50 PM   Result Value Ref Range    WBC 7.6 4.0 - 11.0 10e9/L    RBC Count 4.60 3.8 - 5.2 10e12/L    Hemoglobin 13.4 11.7 - 15.7 g/dL    Hematocrit 40.1 35.0 - 47.0 %    MCV 87 78 - 100 fl    MCH 29.1 26.5 - 33.0 pg    MCHC 33.4 31.5 - 36.5 g/dL    RDW 13.8 10.0 - 15.0 %    Platelet Count 285 150 - 450 10e9/L    Diff Method Automated Method     % Neutrophils 57.1 %    % Lymphocytes 32.5 %    % Monocytes 7.4 %    % Eosinophils 2.4 %    % Basophils 0.5 %    % Immature Granulocytes 0.1 %    Nucleated RBCs 0 0 /100    Absolute Neutrophil 4.3 1.6 - 8.3 10e9/L    Absolute Lymphocytes 2.5 0.8 - 5.3 10e9/L    Absolute Monocytes 0.6 0.0 - 1.3 10e9/L    Absolute Eosinophils 0.2 0.0 - 0.7 10e9/L    Absolute Basophils 0.0 0.0 - 0.2 10e9/L    Abs Immature Granulocytes 0.0 0 - 0.4 10e9/L    Absolute Nucleated RBC 0.0    Comprehensive metabolic panel    Collection  Time: 05/13/17  3:50 PM   Result Value Ref Range    Sodium 144 133 - 144 mmol/L    Potassium 3.9 3.4 - 5.3 mmol/L    Chloride 108 94 - 109 mmol/L    Carbon Dioxide 29 20 - 32 mmol/L    Anion Gap 7 3 - 14 mmol/L    Glucose 80 70 - 99 mg/dL    Urea Nitrogen 19 7 - 30 mg/dL    Creatinine 0.88 0.52 - 1.04 mg/dL    GFR Estimate 75 >60 mL/min/1.7m2    GFR Estimate If Black >90   GFR Calc   >60 mL/min/1.7m2    Calcium 9.4 8.5 - 10.1 mg/dL    Bilirubin Total 0.5 0.2 - 1.3 mg/dL    Albumin 3.9 3.4 - 5.0 g/dL    Protein Total 7.8 6.8 - 8.8 g/dL    Alkaline Phosphatase 30 (L) 40 - 150 U/L    ALT 20 0 - 50 U/L    AST 22 0 - 45 U/L   Acetaminophen level    Collection Time: 05/13/17  3:50 PM   Result Value Ref Range    Acetaminophen Level <2  Therapeutic range: 10-20 mg/L   mg/L   Alcohol level blood    Collection Time: 05/13/17  3:50 PM   Result Value Ref Range    Ethanol g/dL <0.01 <0.01 g/dL   Drug abuse screen urine    Collection Time: 05/13/17  6:02 PM   Result Value Ref Range    Amphetamine Qual Urine (A) NEG     Positive   Cutoff for a positive amphetamine is greater than 500 ng/mL. This is an   unconfirmed screening result to be used for medical purposes only.      Barbiturates Qual Urine  NEG     Negative   Cutoff for a negative barbiturate is 200 ng/mL or less.      Benzodiazepine Qual Urine (A) NEG     Positive   Cutoff for a positive benzodiazepine is greater than 200 ng/mL. This is an   unconfirmed screening result to be used for medical purposes only.      Cannabinoids Qual Urine  NEG     Negative   Cutoff for a negative cannabinoid is 50 ng/mL or less.      Cocaine Qual Urine  NEG     Negative   Cutoff for a negative cocaine is 300 ng/mL or less.      Opiates Qualitative Urine  NEG     Negative   Cutoff for a negative opiate is 300 ng/mL or less.      PCP Qual Urine  NEG     Negative   Cutoff for a negative PCP is 25 ng/mL or less.     HCG qualitative urine    Collection Time: 05/13/17  6:02 PM    Result Value Ref Range    HCG Qual Urine Negative NEG       >30 minutes was spent on this discharge to allow for reviewing the patient's response to treatment, reviewing plan of care, education on medications and diagnosis, and conducting a risk assessment.

## 2017-05-16 NOTE — PLAN OF CARE
Problem: General Plan of Care (Inpatient Behavioral)  Goal: Individualization/Patient Specific Goal (IP Behavioral)  The patient and/or their representative will achieve their patient-specific goals related to the plan of care.    The patient-specific goals include:   Outcome: Improving  Illness Management Recovery model:  Ways to [place].     Patient identified the following specific strategies to cope with their symptoms:     1. DBT skills  2. Go for a walk  3. na

## 2017-05-16 NOTE — PROGRESS NOTES
Pt discharged at this time. Reviewed and verbalized understanding of Discharge Instructions. Pt left with copy of Discharge Instructions, discharge medication (PRN Vistaril), and all belongings.

## 2017-05-16 NOTE — PLAN OF CARE
Problem: Depressive Symptoms  Goal: Depressive Symptoms  Signs and symptoms of listed problems will be absent or manageable.   Outcome: Improving  Pt is planning on discharging to her mothers today. Pt did not eat breakfast but did eat lunch. Pt laid in bed until lunch time except to come out once for patch and PRN. Pt refused AM medications but did come out for the nicotine patch and PRN visteral for anxiety.  PT stated the visteral was not helpful and PRN zyprexa was given and pt states was not helpful. Pt does not plan on going to any groups and wants to be discharged as soon as possible.  Denies SI and SIB. Denies psychotic symptoms.

## 2017-07-05 ENCOUNTER — HOSPITAL ENCOUNTER (EMERGENCY)
Facility: CLINIC | Age: 32
Discharge: LEFT BEFORE DISCHARGE | End: 2017-07-05
Admitting: PHYSICIAN ASSISTANT
Payer: COMMERCIAL

## 2017-07-05 VITALS
OXYGEN SATURATION: 97 % | SYSTOLIC BLOOD PRESSURE: 108 MMHG | DIASTOLIC BLOOD PRESSURE: 73 MMHG | HEART RATE: 81 BPM | BODY MASS INDEX: 28.79 KG/M2 | TEMPERATURE: 98 F | HEIGHT: 68 IN | RESPIRATION RATE: 18 BRPM | WEIGHT: 190 LBS

## 2017-07-05 PROCEDURE — 40000268 ZZH STATISTIC NO CHARGES

## 2017-12-06 ENCOUNTER — HOSPITAL ENCOUNTER (EMERGENCY)
Facility: CLINIC | Age: 32
Discharge: HOME | End: 2017-12-06
Attending: EMERGENCY MEDICINE | Admitting: EMERGENCY MEDICINE
Payer: COMMERCIAL

## 2017-12-06 ENCOUNTER — APPOINTMENT (OUTPATIENT)
Dept: GENERAL RADIOLOGY | Facility: CLINIC | Age: 32
End: 2017-12-06
Attending: EMERGENCY MEDICINE
Payer: COMMERCIAL

## 2017-12-06 VITALS
OXYGEN SATURATION: 100 % | SYSTOLIC BLOOD PRESSURE: 118 MMHG | HEIGHT: 68 IN | DIASTOLIC BLOOD PRESSURE: 76 MMHG | RESPIRATION RATE: 18 BRPM | TEMPERATURE: 97.5 F

## 2017-12-06 DIAGNOSIS — F10.929 ALCOHOLIC INTOXICATION WITH COMPLICATION (H): ICD-10-CM

## 2017-12-06 DIAGNOSIS — S82.232A CLOSED DISPLACED OBLIQUE FRACTURE OF SHAFT OF LEFT TIBIA, INITIAL ENCOUNTER: ICD-10-CM

## 2017-12-06 DIAGNOSIS — S82.832A CLOSED FRACTURE OF PROXIMAL END OF LEFT FIBULA, UNSPECIFIED FRACTURE MORPHOLOGY, INITIAL ENCOUNTER: ICD-10-CM

## 2017-12-06 PROCEDURE — 96361 HYDRATE IV INFUSION ADD-ON: CPT

## 2017-12-06 PROCEDURE — 96375 TX/PRO/DX INJ NEW DRUG ADDON: CPT

## 2017-12-06 PROCEDURE — 29505 APPLICATION LONG LEG SPLINT: CPT | Mod: LT

## 2017-12-06 PROCEDURE — 25000128 H RX IP 250 OP 636

## 2017-12-06 PROCEDURE — 96376 TX/PRO/DX INJ SAME DRUG ADON: CPT

## 2017-12-06 PROCEDURE — 73590 X-RAY EXAM OF LOWER LEG: CPT | Mod: LT

## 2017-12-06 PROCEDURE — 96374 THER/PROPH/DIAG INJ IV PUSH: CPT

## 2017-12-06 PROCEDURE — 99285 EMERGENCY DEPT VISIT HI MDM: CPT | Mod: 25

## 2017-12-06 PROCEDURE — 25000128 H RX IP 250 OP 636: Performed by: EMERGENCY MEDICINE

## 2017-12-06 RX ORDER — HYDROMORPHONE HYDROCHLORIDE 1 MG/ML
0.5 INJECTION, SOLUTION INTRAMUSCULAR; INTRAVENOUS; SUBCUTANEOUS ONCE
Status: COMPLETED | OUTPATIENT
Start: 2017-12-06 | End: 2017-12-06

## 2017-12-06 RX ORDER — OXYCODONE AND ACETAMINOPHEN 5; 325 MG/1; MG/1
1-2 TABLET ORAL EVERY 4 HOURS PRN
Qty: 25 TABLET | Refills: 0 | Status: ON HOLD | OUTPATIENT
Start: 2017-12-06 | End: 2017-12-11

## 2017-12-06 RX ORDER — HYDROMORPHONE HYDROCHLORIDE 1 MG/ML
INJECTION, SOLUTION INTRAMUSCULAR; INTRAVENOUS; SUBCUTANEOUS
Status: COMPLETED
Start: 2017-12-06 | End: 2017-12-06

## 2017-12-06 RX ORDER — KETOROLAC TROMETHAMINE 30 MG/ML
30 INJECTION, SOLUTION INTRAMUSCULAR; INTRAVENOUS ONCE
Status: COMPLETED | OUTPATIENT
Start: 2017-12-06 | End: 2017-12-06

## 2017-12-06 RX ADMIN — KETOROLAC TROMETHAMINE 30 MG: 30 INJECTION, SOLUTION INTRAMUSCULAR at 04:27

## 2017-12-06 RX ADMIN — HYDROMORPHONE HYDROCHLORIDE 0.5 MG: 1 INJECTION, SOLUTION INTRAMUSCULAR; INTRAVENOUS; SUBCUTANEOUS at 02:38

## 2017-12-06 RX ADMIN — Medication 0.5 MG: at 03:24

## 2017-12-06 RX ADMIN — HYDROMORPHONE HYDROCHLORIDE 0.5 MG: 1 INJECTION, SOLUTION INTRAMUSCULAR; INTRAVENOUS; SUBCUTANEOUS at 02:33

## 2017-12-06 RX ADMIN — SODIUM CHLORIDE 1000 ML: 9 INJECTION, SOLUTION INTRAVENOUS at 03:20

## 2017-12-06 RX ADMIN — Medication 0.5 MG: at 03:49

## 2017-12-06 ASSESSMENT — ENCOUNTER SYMPTOMS
WOUND: 1
NECK PAIN: 0
NUMBNESS: 0
HEADACHES: 0
ARTHRALGIAS: 0

## 2017-12-06 NOTE — DISCHARGE INSTRUCTIONS
Keep your leg elevated as much as possible.    Take Ibuprofen 400 mg every 6 hours as needed for pain (take with food).    Do not bear any weight on your leg/foot.    Opioid Medication Information    You have been given a prescription for an opioid (narcotic) pain medicine and/or have received a pain medicine while here in the Emergency Department. These medicines can make you drowsy or impaired. You must not drive, operate dangerous equipment, or engage in any other dangerous activities while taking these medications. If you drive while taking these medications, you could be arrested for DUI, or driving under the influence. Do not drink any alcohol while you are taking these medications.   Opioid pain medications can cause addiction. If you have a history of chemical dependency of any type, you are at a higher risk of becoming addicted to pain medications.  Only take these prescribed medications to treat your pain when all other options have been tried. Take it for as short a time and as few doses as possible. Store your pain pills in a secure place, as they are frequently stolen and provide a dangerous opportunity for children or visitors in your house to start abusing these powerful medications. We will not replace any lost or stolen medicine.  As soon as your pain is better, you should flush all your remaining medication.   Many prescription pain medications contain Tylenol  (acetaminophen), including Vicodin , Tylenol #3 , Norco , Lortab , and Percocet .  You should not take any extra pills of Tylenol  if you are using these prescription medications or you can get very sick.  Do not ever take more than 4000 mg of acetaminophen in any 24 hour period.  All opioids tend to cause constipation. Drink plenty of water and eat foods that have a lot of fiber, such as fruits, vegetables, prune juice, apple juice and high fiber cereal.  Take a laxative if you don t move your bowels at least every other day. Miralax , Milk  of Magnesia, Colace , or Senna  can be used to keep you regular.

## 2017-12-06 NOTE — ED AVS SNAPSHOT
Emergency Department    [address] [facility] [address]    Phone:  [phone]    Fax:  [phone]                                       Marialuisa Streeter   MRN: [id number]    Department:   Emergency Department   Date of Visit:  12/6/2017           After Visit Summary Signature Page     I have received my discharge instructions, and my questions have been answered. I have discussed any challenges I see with this plan with the nurse or doctor.    ..........................................................................................................................................  Patient/Patient Representative Signature      ..........................................................................................................................................  Patient Representative Print Name and Relationship to Patient    ..................................................               ................................................  Date                                            Time    ..........................................................................................................................................  Reviewed by Signature/Title    ...................................................              ..............................................  Date                                                            Time

## 2017-12-06 NOTE — ED AVS SNAPSHOT
Emergency Department    [address] [facility] [address]    Phone:  [phone]    Fax:  [phone]                                       Marialuisa Streeter   MRN: [id number]    Department:   Emergency Department   Date of Visit:  12/6/2017           Patient Information     Date Of Birth          1985        Your diagnoses for this visit were:     Closed displaced oblique fracture of shaft of left tibia, initial encounter     Closed fracture of proximal end of left fibula, unspecified fracture morphology, initial encounter     Alcoholic intoxication with complication (H)        You were seen by Tiffany Wesley MD.      Follow-up Information     Follow up with Husam Schrader MD.    Specialty:  Orthopedics    Why:  call clinic later today to schedule follow-up and surgery    Contact information:    [facility]  [address] [place] [id number] [phone]          Follow up with  Emergency Department.    Specialty:  EMERGENCY MEDICINE    Why:  As needed for worsened pain or numbness or other problems.    Contact information:    [address] [facility] [address] [phone]        Discharge Instructions       Keep your leg elevated as much as possible.    Take Ibuprofen 400 mg every 6 hours as needed for pain (take with food).    Do not bear any weight on your leg/foot.    Opioid Medication Information    You have been given a prescription for an opioid (narcotic) pain medicine and/or have received a pain medicine while here in the Emergency Department. These medicines can make you drowsy or impaired. You must not drive, operate dangerous equipment, or engage in any other dangerous activities while taking these medications. If you drive while taking these medications, you could be arrested for DUI, or driving under the influence. Do not drink any alcohol while you are taking these medications.   Opioid pain medications can cause  addiction. If you have a history of chemical dependency of any type, you are at a higher risk of becoming addicted to pain medications.  Only take these prescribed medications to treat your pain when all other options have been tried. Take it for as short a time and as few doses as possible. Store your pain pills in a secure place, as they are frequently stolen and provide a dangerous opportunity for children or visitors in your house to start abusing these powerful medications. We will not replace any lost or stolen medicine.  As soon as your pain is better, you should flush all your remaining medication.   Many prescription pain medications contain Tylenol  (acetaminophen), including Vicodin , Tylenol #3 , Norco , Lortab , and Percocet .  You should not take any extra pills of Tylenol  if you are using these prescription medications or you can get very sick.  Do not ever take more than 4000 mg of acetaminophen in any 24 hour period.  All opioids tend to cause constipation. Drink plenty of water and eat foods that have a lot of fiber, such as fruits, vegetables, prune juice, apple juice and high fiber cereal.  Take a laxative if you don t move your bowels at least every other day. Miralax , Milk of Magnesia, Colace , or Senna  can be used to keep you regular.            Discharge References/Attachments     FRACTURE, LOWER EXTREMITY (ENGLISH)    SPLINT CARE, DISCHARGE INSTRUCTIONS (ENGLISH)      24 Hour Appointment Hotline       To make an appointment at any [facility] clinic, call [phone] ([phone]). If you don't have a family doctor or clinic, we will help you find one. [facility] clinics are conveniently located to serve the needs of you and your family.             Review of your medicines      START taking        Dose / Directions Last dose taken    oxyCODONE-acetaminophen 5-325 MG per tablet   Commonly known as:  PERCOCET   Dose:  1-2 tablet   Quantity:  25 tablet        Take 1-2 tablets by mouth  every 4 hours as needed for moderate to severe pain   Refills:  0          Our records show that you are taking the medicines listed below. If these are incorrect, please call your family doctor or clinic.        Dose / Directions Last dose taken    amphetamine-dextroamphetamine 30 MG per 24 hr capsule   Commonly known as:  ADDERALL XR   Dose:  60 mg        Take 60 mg by mouth daily   Refills:  0        ARIPIPRAZOLE PO   Dose:  10 mg        Take 10 mg by mouth At Bedtime   Refills:  0        hydrOXYzine 25 MG tablet   Commonly known as:  ATARAX   Dose:  25-50 mg   Quantity:  30 tablet        Take 1-2 tablets (25-50 mg) by mouth 3 times daily as needed for anxiety   Refills:  0        NASONEX 50 MCG/ACT spray   Quantity:  1   Generic drug:  mometasone        INHALE 2 SPRAYS IN EACH NOSTRIL ONE DAILY   Refills:  0        REMERON PO   Dose:  30 mg        Take 30 mg by mouth At Bedtime   Refills:  0        ZOLPIDEM TARTRATE PO   Dose:  10 mg        Take 10 mg by mouth nightly as needed for sleep   Refills:  0                Prescriptions were sent or printed at these locations (1 Prescription)                   Other Prescriptions                Printed at Department/Unit printer (1 of 1)         oxyCODONE-acetaminophen (PERCOCET) 5-325 MG per tablet                Procedures and tests performed during your visit     Tib/Fib XR, left      Orders Needing Specimen Collection     None      Pending Results     Date and Time Order Name Status Description    12/6/2017 0239 Tib/Fib XR, left Preliminary             Pending Culture Results     No orders found from 12/4/2017 to 12/7/2017.            Pending Results Instructions     If you had any lab results that were not finalized at the time of your Discharge, you can call the ED Lab Result RN at [phone]. You will be contacted by this team for any positive Lab results or changes in treatment. The nurses are available 7 days a week from 10A to 6:30P.  You can leave a  message 24 hours per day and they will return your call.        Test Results From Your Hospital Stay        12/6/2017  3:10 AM      Narrative     XR TIBIA & FIBULA LT 2 VW  12/6/2017 3:03 AM      HISTORY: Check for fracture, deformity lower leg after fall.      COMPARISON: None.        Impression     IMPRESSION: Oblique, mildly displaced fracture of the distal tibial  shaft. There is also a minimally displaced fracture of the fibula  head.                Clinical Quality Measure: Blood Pressure Screening     Your blood pressure was checked while you were in the emergency department today. The last reading we obtained was  BP: 112/73 . Please read the guidelines below about what these numbers mean and what you should do about them.  If your systolic blood pressure (the top number) is less than 120 and your diastolic blood pressure (the bottom number) is less than 80, then your blood pressure is normal. There is nothing more that you need to do about it.  If your systolic blood pressure (the top number) is 120-139 or your diastolic blood pressure (the bottom number) is 80-89, your blood pressure may be higher than it should be. You should have your blood pressure rechecked within a year by a primary care provider.  If your systolic blood pressure (the top number) is 140 or greater or your diastolic blood pressure (the bottom number) is 90 or greater, you may have high blood pressure. High blood pressure is treatable, but if left untreated over time it can put you at risk for heart attack, stroke, or kidney failure. You should have your blood pressure rechecked by a primary care provider within the next 4 weeks.  If your provider in the emergency department today gave you specific instructions to follow-up with your doctor or provider even sooner than that, you should follow that instruction and not wait for up to 4 weeks for your follow-up visit.        Thank you for choosing [facility]       Thank you for choosing  "[facility] for your care. Our goal is always to provide you with excellent care. Hearing back from our patients is one way we can continue to improve our services. Please take a few minutes to complete the written survey that you may receive in the mail after you visit with us. Thank you!        [facility] Information     [facility] lets you send messages to your doctor, view your test results, renew your prescriptions, schedule appointments and more. To sign up, go to [email or web address] . Click on \"Log in\" on the left side of the screen, which will take you to the Welcome page. Then click on \"Sign up Now\" on the right side of the page.     You will be asked to enter the access code listed below, as well as some personal information. Please follow the directions to create your username and password.     Your access code is: [email or web address]  Expires: 3/6/2018  4:46 AM     Your access code will  in 90 days. If you need help or a new code, please call your [facility] clinic or [phone].        Care EveryWhere ID     This is your Care EveryWhere ID. This could be used by other organizations to access your [facility] medical records  [id number]        Equal Access to Services     SANG ELDRIDGE : Abdoulaye Astudillo, wavinnieda bashir, qaybta kaalmada adelis, montrell cole. So [place] [phone].    ATENCIÓN: Si habla español, tiene a garcia disposición servicios gratuitos de asistencia lingüística. Llame al [phone].    We comply with applicable federal civil rights laws and [place] laws. We do not discriminate on the basis of race, color, national origin, age, disability, sex, sexual orientation, or gender identity.            After Visit Summary       This is your record. Keep this with you and show to your community pharmacist(s) and doctor(s) at your next visit.                  "

## 2017-12-06 NOTE — ED PROVIDER NOTES
"  History     Chief Complaint:  Fall    HPI   Marialuisa Streeter is a 32 year old female who presents splinted by EMS to the emergency department intoxicated after a fall. The patient was drinking at [email or web address] and she slipped on the ice, twisting, and injuring her left lower leg. She states it feels like the \"bone is grinding\" and that there are \"pops and crackles\" in the area. She endorses sensation in her toes. The patient denies hitting her head, numbness, knee pain, ankle pain, chance of pregnancy, hitting her head, neck pain, and a past leg fracture. Of note, the patient has some wounds on her foot and arms which she believes are an allergic reaction and so she is seeing an Allergist in two days.     Allergies:  Amoxicillin  Ceftin [Cefuroxime]      Medications:    Atarax  Remeron  Adderall  Aripiprazole  Zolpidem Tartrate  Nasonex     Past Medical History:    ADHD without mention of hyperactivity  Depressive disorder    Past Surgical History:    History reviewed. No pertinent past surgical history.     Family History:    History reviewed. No pertinent family history.      Social History:  The patient was accompanied to the ED by EMS.  Smoking Status: Current every day smoker, 0.5 PPD  Smokeless Tobacco: Very little  Alcohol Use: No   Marital Status:  Single     Review of Systems   Musculoskeletal: Negative for arthralgias and neck pain.        Pain in her left anterior shin     Skin: Positive for wound.   Neurological: Negative for numbness and headaches.   All other systems reviewed and are negative.    Physical Exam   Patient Vitals for the past 24 hrs:   BP Temp Temp src Heart Rate Resp SpO2 Height   12/06/17 0400 112/73 - - - - 100 % -   12/06/17 0330 123/66 - - - - - -   12/06/17 0319 106/68 - - 88 - 100 % -   12/06/17 0245 - - - - - 96 % -   12/06/17 0218 (!) 132/91 97.5  F (36.4  C) Oral 100 18 100 % 1.715 m (5' 7.5\")     Physical Exam   Nursing note and vitals reviewed.  Constitutional: "  Appears well-developed and well-nourished. She is tearful and appears to be in pain  HENT:   Head:    Atraumatic.   Mouth/Throat:   Oropharynx is clear and moist. No oropharyngeal exudate.   Eyes:    Pupils are equal, round, and reactive to light.   Neck:    Normal range of motion. Neck supple.      No tracheal deviation present. No thyromegaly present.   Cardiovascular:  Normal rate, regular rhythm, no murmur   Pulmonary/Chest: Breath sounds are clear and equal without wheezes or crackles.  Abdominal:   Soft. Bowel sounds are normal. Exhibits no distension and      no mass. There is no tenderness.      There is no rebound and no guarding.   Musculoskeletal:  Left leg exam: There is swelling ant tenderness to the distal tibia and the proximal fibula. Ankle, foot and hip are nontender., There are no breaks to the skin. The muscle compartments are soft. Good dorsalis, pedis, and posterior tibial pulses. Sensation intact distally. No knee joint effusion. ROM of the toes, foot and ankle intact. Right leg and both arms nontender.   Back:   Back nontender   Lymphadenopathy:  No cervical adenopathy.   Neurological:   Alert and oriented to person, place, and time. GCS 15.  CN 2-12 intact.  and proximal upper extremity strength strong and equal.  Bilateral lower extremity strength strong and equal, including strong dorsiflexion and plantarflexion strength.  Sensation intact and equal to the face, arms and legs.  No facial droop or weakness. Normal speech.  Follows commands and answers questions normally.    Skin:    Skin is warm and dry. No rash noted. No pallor.      Emergency Department Course     Imaging:  Radiology findings were communicated with the patient who voiced understanding of the findings.    Tib/Fib XR, left 1:  IMPRESSION: Oblique, mildly displaced fracture of the distal tibial  shaft. There is also a minimally displaced fracture of the fibula  Head.  Report per radiology     Procedures:    Narrative:  Left Lower Extremity Splint Placement     Long leg posterior and stirrup plaster splint was applied and after placement I checked and adjusted the fit to ensure proper positioning. Patient was more comfortable with splint in place. Sensation and circulation are intact after splint placement.    Interventions:  0233 - Dilaudid 0.5mg IV   0238 - Dilaudid 0.5mg IV   0320 - NS Bolus 1,000mL IV   0324 - Dilaudid 0.5mg IV   0349 - Dilaudid 0.5mg IV   0427 - Toradol 30mg IV      Emergency Department Course:  Nursing notes and vitals reviewed.  The patient was sent for a Tib/Fib XR, left 1 while in the emergency department, results above.   0229: I performed an exam of the patient as documented above.   0327: Patient rechecked and updated.   0410: I spoke with Dr. Schrader of Orthopedic Surgery regarding patient's presentation, findings, and plan of care.  0430: Patient rechecked and updated.   Findings and plan explained to the Patient. Patient discharged home with instructions regarding supportive care, medications, and reasons to return. The importance of close follow-up was reviewed. The patient was prescribed Percocet.  I personally reviewed the imaging results with the Patient and answered all related questions prior to discharge.    Impression & Plan      Medical Decision Making:  Marialuisa Streeter is a 32 year old female who EMS presents to the emergency department after a fall with leg pain. I found the patient to have a closed oblique mildly displaced fracture of the shaft of the tibia and a proximal fibula fracture from a torquing injury during her fall. She is neurovascularly intact and she does not have any signs of compartment syndrome and the fracture is closed. I discussed case with the Orthopedic Trauma Surgeon Dr. Husam Schrader and we felt that this could be treated best as an outpatient. Patient also wishes to go home and her pain is under good control. She was placed in a posterior and sugar  tongue long leg splint with plaster material and fitted for crutches and discharged to home. She plans to stay at her parent's house. She was told to call the [facility] in the morning to get set up for surgery and instructed on signs and symptoms to watch for including worsening pain, numbness and other signs of compartment syndrome. She was written for percocet for pain and told not to drive a car or drink alcohol for six hours after taking it.     Diagnosis:    ICD-10-CM    1. Closed displaced oblique fracture of shaft of left tibia, initial encounter S82.232A    2. Closed fracture of proximal end of left fibula, unspecified fracture morphology, initial encounter S82.832A    3. Alcoholic intoxication with complication (H) F10.929        Disposition:  Discharged to home.    Discharge Medications:  New Prescriptions    OXYCODONE-ACETAMINOPHEN (PERCOCET) 5-325 MG PER TABLET    Take 1-2 tablets by mouth every 4 hours as needed for moderate to severe pain       Scribe Disclosure:  Estefani PEREZ, am serving as a scribe at 2:29 AM on 12/6/2017 to document services personally performed by Tiffany Wesley MD based on my observations and the provider's statements to me.   12/6/2017    EMERGENCY DEPARTMENT       Tiffany Wesley MD  12/06/17 0609

## 2017-12-11 ENCOUNTER — TRANSFERRED RECORDS (OUTPATIENT)
Dept: HEALTH INFORMATION MANAGEMENT | Facility: CLINIC | Age: 32
End: 2017-12-11

## 2017-12-11 ENCOUNTER — ANESTHESIA EVENT (OUTPATIENT)
Dept: SURGERY | Facility: CLINIC | Age: 32
End: 2017-12-11
Payer: COMMERCIAL

## 2017-12-11 ENCOUNTER — HOSPITAL ENCOUNTER (OUTPATIENT)
Facility: CLINIC | Age: 32
Discharge: HOME | End: 2017-12-12
Attending: ORTHOPAEDIC SURGERY | Admitting: ORTHOPAEDIC SURGERY
Payer: COMMERCIAL

## 2017-12-11 ENCOUNTER — APPOINTMENT (OUTPATIENT)
Dept: GENERAL RADIOLOGY | Facility: CLINIC | Age: 32
End: 2017-12-11
Attending: ORTHOPAEDIC SURGERY
Payer: COMMERCIAL

## 2017-12-11 ENCOUNTER — ANESTHESIA (OUTPATIENT)
Dept: SURGERY | Facility: CLINIC | Age: 32
End: 2017-12-11
Payer: COMMERCIAL

## 2017-12-11 DIAGNOSIS — S82.402D CLOSED FRACTURE OF SHAFT OF LEFT TIBIA AND FIBULA WITH ROUTINE HEALING, SUBSEQUENT ENCOUNTER: Primary | ICD-10-CM

## 2017-12-11 DIAGNOSIS — S82.202D CLOSED FRACTURE OF SHAFT OF LEFT TIBIA AND FIBULA WITH ROUTINE HEALING, SUBSEQUENT ENCOUNTER: Primary | ICD-10-CM

## 2017-12-11 PROBLEM — S82.209A TIBIA/FIBULA FRACTURE, SHAFT: Status: ACTIVE | Noted: 2017-12-11

## 2017-12-11 PROBLEM — S82.409A TIBIA/FIBULA FRACTURE, SHAFT: Status: ACTIVE | Noted: 2017-12-11

## 2017-12-11 LAB — HCG UR QL: NEGATIVE

## 2017-12-11 PROCEDURE — 40000170 ZZH STATISTIC PRE-PROCEDURE ASSESSMENT II: Performed by: ORTHOPAEDIC SURGERY

## 2017-12-11 PROCEDURE — 40000277 XR SURGERY CARM FLUORO LESS THAN 5 MIN W STILLS

## 2017-12-11 PROCEDURE — 25000566 ZZH SEVOFLURANE, EA 15 MIN: Performed by: ORTHOPAEDIC SURGERY

## 2017-12-11 PROCEDURE — 25000128 H RX IP 250 OP 636: Performed by: ORTHOPAEDIC SURGERY

## 2017-12-11 PROCEDURE — 25000128 H RX IP 250 OP 636: Performed by: ANESTHESIOLOGY

## 2017-12-11 PROCEDURE — 36000065 ZZH SURGERY LEVEL 4 W FLUORO 1ST 30 MIN: Performed by: ORTHOPAEDIC SURGERY

## 2017-12-11 PROCEDURE — 71000012 ZZH RECOVERY PHASE 1 LEVEL 1 FIRST HR: Performed by: ORTHOPAEDIC SURGERY

## 2017-12-11 PROCEDURE — 81025 URINE PREGNANCY TEST: CPT | Performed by: ANESTHESIOLOGY

## 2017-12-11 PROCEDURE — 27210794 ZZH OR GENERAL SUPPLY STERILE: Performed by: ORTHOPAEDIC SURGERY

## 2017-12-11 PROCEDURE — 37000008 ZZH ANESTHESIA TECHNICAL FEE, 1ST 30 MIN: Performed by: ORTHOPAEDIC SURGERY

## 2017-12-11 PROCEDURE — C1713 ANCHOR/SCREW BN/BN,TIS/BN: HCPCS | Performed by: ORTHOPAEDIC SURGERY

## 2017-12-11 PROCEDURE — 25000128 H RX IP 250 OP 636

## 2017-12-11 PROCEDURE — 25000128 H RX IP 250 OP 636: Performed by: NURSE ANESTHETIST, CERTIFIED REGISTERED

## 2017-12-11 PROCEDURE — 27211024 ZZHC OR SUPPLY OTHER OPNP: Performed by: ORTHOPAEDIC SURGERY

## 2017-12-11 PROCEDURE — 37000009 ZZH ANESTHESIA TECHNICAL FEE, EACH ADDTL 15 MIN: Performed by: ORTHOPAEDIC SURGERY

## 2017-12-11 PROCEDURE — 25000132 ZZH RX MED GY IP 250 OP 250 PS 637: Performed by: ORTHOPAEDIC SURGERY

## 2017-12-11 PROCEDURE — 25000125 ZZHC RX 250: Performed by: NURSE ANESTHETIST, CERTIFIED REGISTERED

## 2017-12-11 PROCEDURE — 25000125 ZZHC RX 250: Performed by: ORTHOPAEDIC SURGERY

## 2017-12-11 PROCEDURE — 27210995 ZZH RX 272: Performed by: ORTHOPAEDIC SURGERY

## 2017-12-11 PROCEDURE — 36000063 ZZH SURGERY LEVEL 4 EA 15 ADDTL MIN: Performed by: ORTHOPAEDIC SURGERY

## 2017-12-11 DEVICE — IMP SCR SYN 5.0 TI LOCK T25 STARDRIVE 38MM 04.005.528S: Type: IMPLANTABLE DEVICE | Site: TIBIA | Status: FUNCTIONAL

## 2017-12-11 DEVICE — IMPLANTABLE DEVICE: Type: IMPLANTABLE DEVICE | Site: TIBIA | Status: FUNCTIONAL

## 2017-12-11 DEVICE — IMP SCR SYN 5.0 TI LOCK T25 STARDRIVE 40MM 04.005.530S: Type: IMPLANTABLE DEVICE | Site: TIBIA | Status: FUNCTIONAL

## 2017-12-11 DEVICE — IMP SCR SYN 5.0 TI LOCK T25 STARDRIVE 42MM 04.005.532S: Type: IMPLANTABLE DEVICE | Site: TIBIA | Status: FUNCTIONAL

## 2017-12-11 RX ORDER — PROPOFOL 10 MG/ML
INJECTION, EMULSION INTRAVENOUS CONTINUOUS PRN
Status: DISCONTINUED | OUTPATIENT
Start: 2017-12-11 | End: 2017-12-11

## 2017-12-11 RX ORDER — FENTANYL CITRATE 50 UG/ML
25-50 INJECTION, SOLUTION INTRAMUSCULAR; INTRAVENOUS
Status: DISCONTINUED | OUTPATIENT
Start: 2017-12-11 | End: 2017-12-11 | Stop reason: HOSPADM

## 2017-12-11 RX ORDER — MAGNESIUM HYDROXIDE 1200 MG/15ML
LIQUID ORAL PRN
Status: DISCONTINUED | OUTPATIENT
Start: 2017-12-11 | End: 2017-12-11 | Stop reason: HOSPADM

## 2017-12-11 RX ORDER — PROPOFOL 10 MG/ML
INJECTION, EMULSION INTRAVENOUS PRN
Status: DISCONTINUED | OUTPATIENT
Start: 2017-12-11 | End: 2017-12-11

## 2017-12-11 RX ORDER — FENTANYL CITRATE 50 UG/ML
INJECTION, SOLUTION INTRAMUSCULAR; INTRAVENOUS PRN
Status: DISCONTINUED | OUTPATIENT
Start: 2017-12-11 | End: 2017-12-11

## 2017-12-11 RX ORDER — LISDEXAMFETAMINE DIMESYLATE 70 MG/1
70 CAPSULE ORAL DAILY
Status: DISCONTINUED | OUTPATIENT
Start: 2017-12-11 | End: 2017-12-12 | Stop reason: HOSPADM

## 2017-12-11 RX ORDER — ALBUTEROL SULFATE 90 UG/1
1-2 AEROSOL, METERED RESPIRATORY (INHALATION) 2 TIMES DAILY PRN
COMMUNITY

## 2017-12-11 RX ORDER — SODIUM CHLORIDE, SODIUM LACTATE, POTASSIUM CHLORIDE, CALCIUM CHLORIDE 600; 310; 30; 20 MG/100ML; MG/100ML; MG/100ML; MG/100ML
INJECTION, SOLUTION INTRAVENOUS CONTINUOUS
Status: DISCONTINUED | OUTPATIENT
Start: 2017-12-11 | End: 2017-12-11 | Stop reason: HOSPADM

## 2017-12-11 RX ORDER — HYDROMORPHONE HYDROCHLORIDE 1 MG/ML
.3-.5 INJECTION, SOLUTION INTRAMUSCULAR; INTRAVENOUS; SUBCUTANEOUS EVERY 30 MIN PRN
Status: DISCONTINUED | OUTPATIENT
Start: 2017-12-11 | End: 2017-12-12 | Stop reason: HOSPADM

## 2017-12-11 RX ORDER — ALBUTEROL SULFATE 90 UG/1
1-2 AEROSOL, METERED RESPIRATORY (INHALATION) 2 TIMES DAILY PRN
Status: DISCONTINUED | OUTPATIENT
Start: 2017-12-11 | End: 2017-12-12 | Stop reason: HOSPADM

## 2017-12-11 RX ORDER — ONDANSETRON 4 MG/1
4 TABLET, ORALLY DISINTEGRATING ORAL EVERY 6 HOURS PRN
Status: DISCONTINUED | OUTPATIENT
Start: 2017-12-11 | End: 2017-12-12 | Stop reason: HOSPADM

## 2017-12-11 RX ORDER — DEXAMETHASONE SODIUM PHOSPHATE 4 MG/ML
INJECTION, SOLUTION INTRA-ARTICULAR; INTRALESIONAL; INTRAMUSCULAR; INTRAVENOUS; SOFT TISSUE PRN
Status: DISCONTINUED | OUTPATIENT
Start: 2017-12-11 | End: 2017-12-11

## 2017-12-11 RX ORDER — VARENICLINE TARTRATE 1 MG/1
1 TABLET, FILM COATED ORAL 2 TIMES DAILY
Status: DISCONTINUED | OUTPATIENT
Start: 2017-12-11 | End: 2017-12-12 | Stop reason: HOSPADM

## 2017-12-11 RX ORDER — LIDOCAINE HYDROCHLORIDE 20 MG/ML
INJECTION, SOLUTION INFILTRATION; PERINEURAL PRN
Status: DISCONTINUED | OUTPATIENT
Start: 2017-12-11 | End: 2017-12-11

## 2017-12-11 RX ORDER — NALOXONE HYDROCHLORIDE 0.4 MG/ML
.1-.4 INJECTION, SOLUTION INTRAMUSCULAR; INTRAVENOUS; SUBCUTANEOUS
Status: ACTIVE | OUTPATIENT
Start: 2017-12-11 | End: 2017-12-12

## 2017-12-11 RX ORDER — CLINDAMYCIN PHOSPHATE 900 MG/50ML
900 INJECTION, SOLUTION INTRAVENOUS
Status: COMPLETED | OUTPATIENT
Start: 2017-12-11 | End: 2017-12-11

## 2017-12-11 RX ORDER — HYDROXYZINE HYDROCHLORIDE 25 MG/1
25 TABLET, FILM COATED ORAL EVERY 6 HOURS PRN
Status: DISCONTINUED | OUTPATIENT
Start: 2017-12-11 | End: 2017-12-12 | Stop reason: HOSPADM

## 2017-12-11 RX ORDER — ACETAMINOPHEN 325 MG/1
650 TABLET ORAL EVERY 6 HOURS PRN
Status: DISCONTINUED | OUTPATIENT
Start: 2017-12-11 | End: 2017-12-12 | Stop reason: HOSPADM

## 2017-12-11 RX ORDER — MEPERIDINE HYDROCHLORIDE 25 MG/ML
12.5 INJECTION INTRAMUSCULAR; INTRAVENOUS; SUBCUTANEOUS EVERY 5 MIN PRN
Status: DISCONTINUED | OUTPATIENT
Start: 2017-12-11 | End: 2017-12-11 | Stop reason: HOSPADM

## 2017-12-11 RX ORDER — ONDANSETRON 2 MG/ML
4 INJECTION INTRAMUSCULAR; INTRAVENOUS EVERY 6 HOURS PRN
Status: DISCONTINUED | OUTPATIENT
Start: 2017-12-11 | End: 2017-12-12 | Stop reason: HOSPADM

## 2017-12-11 RX ORDER — PROCHLORPERAZINE MALEATE 5 MG
10 TABLET ORAL EVERY 6 HOURS PRN
Status: DISCONTINUED | OUTPATIENT
Start: 2017-12-11 | End: 2017-12-12 | Stop reason: HOSPADM

## 2017-12-11 RX ORDER — ZOLPIDEM TARTRATE 6.25 MG/1
12.5 TABLET, FILM COATED, EXTENDED RELEASE ORAL
Status: DISCONTINUED | OUTPATIENT
Start: 2017-12-11 | End: 2017-12-12 | Stop reason: HOSPADM

## 2017-12-11 RX ORDER — HYDROMORPHONE HYDROCHLORIDE 1 MG/ML
.3-.5 INJECTION, SOLUTION INTRAMUSCULAR; INTRAVENOUS; SUBCUTANEOUS EVERY 5 MIN PRN
Status: DISCONTINUED | OUTPATIENT
Start: 2017-12-11 | End: 2017-12-11 | Stop reason: HOSPADM

## 2017-12-11 RX ORDER — HYDROMORPHONE HYDROCHLORIDE 1 MG/ML
INJECTION, SOLUTION INTRAMUSCULAR; INTRAVENOUS; SUBCUTANEOUS
Status: COMPLETED
Start: 2017-12-11 | End: 2017-12-11

## 2017-12-11 RX ORDER — ALPRAZOLAM 1 MG
2-3 TABLET ORAL 3 TIMES DAILY PRN
Status: DISCONTINUED | OUTPATIENT
Start: 2017-12-11 | End: 2017-12-12 | Stop reason: HOSPADM

## 2017-12-11 RX ORDER — CLINDAMYCIN PHOSPHATE 900 MG/50ML
900 INJECTION, SOLUTION INTRAVENOUS SEE ADMIN INSTRUCTIONS
Status: DISCONTINUED | OUTPATIENT
Start: 2017-12-11 | End: 2017-12-11 | Stop reason: HOSPADM

## 2017-12-11 RX ORDER — ONDANSETRON 4 MG/1
4 TABLET, ORALLY DISINTEGRATING ORAL EVERY 30 MIN PRN
Status: DISCONTINUED | OUTPATIENT
Start: 2017-12-11 | End: 2017-12-11 | Stop reason: HOSPADM

## 2017-12-11 RX ORDER — OXYCODONE HYDROCHLORIDE 5 MG/1
5-10 TABLET ORAL
Status: DISCONTINUED | OUTPATIENT
Start: 2017-12-11 | End: 2017-12-12 | Stop reason: HOSPADM

## 2017-12-11 RX ORDER — ONDANSETRON 2 MG/ML
INJECTION INTRAMUSCULAR; INTRAVENOUS PRN
Status: DISCONTINUED | OUTPATIENT
Start: 2017-12-11 | End: 2017-12-11

## 2017-12-11 RX ORDER — ONDANSETRON 2 MG/ML
4 INJECTION INTRAMUSCULAR; INTRAVENOUS EVERY 30 MIN PRN
Status: DISCONTINUED | OUTPATIENT
Start: 2017-12-11 | End: 2017-12-11 | Stop reason: HOSPADM

## 2017-12-11 RX ORDER — NALOXONE HYDROCHLORIDE 0.4 MG/ML
.1-.4 INJECTION, SOLUTION INTRAMUSCULAR; INTRAVENOUS; SUBCUTANEOUS
Status: DISCONTINUED | OUTPATIENT
Start: 2017-12-11 | End: 2017-12-12 | Stop reason: HOSPADM

## 2017-12-11 RX ORDER — OXYCODONE AND ACETAMINOPHEN 5; 325 MG/1; MG/1
2 TABLET ORAL EVERY 4 HOURS
Status: ON HOLD | COMMUNITY
End: 2017-12-12

## 2017-12-11 RX ADMIN — OXYCODONE HYDROCHLORIDE 10 MG: 5 TABLET ORAL at 18:52

## 2017-12-11 RX ADMIN — Medication 0.5 MG: at 17:41

## 2017-12-11 RX ADMIN — Medication 0.5 MG: at 15:26

## 2017-12-11 RX ADMIN — FENTANYL CITRATE 100 MCG: 50 INJECTION, SOLUTION INTRAMUSCULAR; INTRAVENOUS at 11:45

## 2017-12-11 RX ADMIN — ZOLPIDEM TARTRATE 12.5 MG: 6.25 TABLET, FILM COATED, EXTENDED RELEASE ORAL at 20:37

## 2017-12-11 RX ADMIN — OXYCODONE HYDROCHLORIDE 10 MG: 5 TABLET ORAL at 21:34

## 2017-12-11 RX ADMIN — CLINDAMYCIN PHOSPHATE 900 MG: 18 INJECTION, SOLUTION INTRAVENOUS at 12:51

## 2017-12-11 RX ADMIN — MIDAZOLAM 2 MG: 1 INJECTION INTRAMUSCULAR; INTRAVENOUS at 12:41

## 2017-12-11 RX ADMIN — ROPIVACAINE HYDROCHLORIDE 40 ML GIVEN: 5 INJECTION, SOLUTION EPIDURAL; INFILTRATION; PERINEURAL at 11:41

## 2017-12-11 RX ADMIN — FENTANYL CITRATE 50 MCG: 50 INJECTION INTRAMUSCULAR; INTRAVENOUS at 15:00

## 2017-12-11 RX ADMIN — MIDAZOLAM HYDROCHLORIDE 2 MG: 1 INJECTION, SOLUTION INTRAMUSCULAR; INTRAVENOUS at 11:45

## 2017-12-11 RX ADMIN — FENTANYL CITRATE 50 MCG: 50 INJECTION, SOLUTION INTRAMUSCULAR; INTRAVENOUS at 14:36

## 2017-12-11 RX ADMIN — ONDANSETRON 4 MG: 2 INJECTION INTRAMUSCULAR; INTRAVENOUS at 14:07

## 2017-12-11 RX ADMIN — SODIUM CHLORIDE, POTASSIUM CHLORIDE, SODIUM LACTATE AND CALCIUM CHLORIDE: 600; 310; 30; 20 INJECTION, SOLUTION INTRAVENOUS at 12:03

## 2017-12-11 RX ADMIN — LIDOCAINE HYDROCHLORIDE 60 MG: 20 INJECTION, SOLUTION INFILTRATION; PERINEURAL at 12:45

## 2017-12-11 RX ADMIN — HYDROXYZINE HYDROCHLORIDE 25 MG: 25 TABLET ORAL at 17:40

## 2017-12-11 RX ADMIN — SODIUM CHLORIDE, POTASSIUM CHLORIDE, SODIUM LACTATE AND CALCIUM CHLORIDE: 600; 310; 30; 20 INJECTION, SOLUTION INTRAVENOUS at 14:14

## 2017-12-11 RX ADMIN — FENTANYL CITRATE 50 MCG: 50 INJECTION, SOLUTION INTRAMUSCULAR; INTRAVENOUS at 12:45

## 2017-12-11 RX ADMIN — Medication 0.5 MG: at 16:37

## 2017-12-11 RX ADMIN — DEXAMETHASONE SODIUM PHOSPHATE 8 MG: 4 INJECTION, SOLUTION INTRA-ARTICULAR; INTRALESIONAL; INTRAMUSCULAR; INTRAVENOUS; SOFT TISSUE at 12:52

## 2017-12-11 RX ADMIN — PROPOFOL 100 MCG/KG/MIN: 10 INJECTION, EMULSION INTRAVENOUS at 12:45

## 2017-12-11 RX ADMIN — PROPOFOL 200 MG: 10 INJECTION, EMULSION INTRAVENOUS at 12:45

## 2017-12-11 RX ADMIN — FENTANYL CITRATE 50 MCG: 50 INJECTION INTRAMUSCULAR; INTRAVENOUS at 15:15

## 2017-12-11 ASSESSMENT — LIFESTYLE VARIABLES: TOBACCO_USE: 1

## 2017-12-11 NOTE — ANESTHESIA PREPROCEDURE EVALUATION
Anesthesia Evaluation     . Pt has had prior anesthetic.     No history of anesthetic complications          ROS/MED HX    ENT/Pulmonary:     (+)tobacco use, Past use asthma , . .   (-) sleep apnea   Neurologic:       Cardiovascular:  - neg cardiovascular ROS       METS/Exercise Tolerance:  >4 METS   Hematologic:         Musculoskeletal:         GI/Hepatic:        (-) GERD   Renal/Genitourinary:         Endo:      (-) Type I DM   Psychiatric:     (+) psychiatric history anxiety and depression      Infectious Disease:         Malignancy:         Other: Comment: H/o idiopathic urticaria                    Physical Exam  Normal systems: dental    Airway   TM distance: >3 FB  Neck ROM: full    Dental     Cardiovascular   Rhythm and rate: normal      Pulmonary    breath sounds clear to auscultation                    Anesthesia Plan      History & Physical Review  History and physical reviewed and following examination; no interval change.    ASA Status:  2 .    NPO Status:  > 8 hours    Plan for LMA and Periph. Nerve Block for postop pain with Intravenous induction. Maintenance will be Balanced.    PONV prophylaxis:  Ondansetron (or other 5HT-3) and Dexamethasone or Solumedrol       Postoperative Care      Consents  Anesthetic plan, risks, benefits and alternatives discussed with:  Patient..                          .

## 2017-12-11 NOTE — H&P (VIEW-ONLY)
"  History     Chief Complaint:  Fall    HPI   Marialuisa Streeter is a 32 year old female who presents splinted by EMS to the emergency department intoxicated after a fall. The patient was drinking at [employer] and she slipped on the ice, twisting, and injuring her left lower leg. She states it feels like the \"bone is grinding\" and that there are \"pops and crackles\" in the area. She endorses sensation in her toes. The patient denies hitting her head, numbness, knee pain, ankle pain, chance of pregnancy, hitting her head, neck pain, and a past leg fracture. Of note, the patient has some wounds on her foot and arms which she believes are an allergic reaction and so she is seeing an Allergist in two days.     Allergies:  Amoxicillin  Ceftin [Cefuroxime]      Medications:    Atarax  Remeron  Adderall  Aripiprazole  Zolpidem Tartrate  Nasonex     Past Medical History:    ADHD without mention of hyperactivity  Depressive disorder    Past Surgical History:    History reviewed. No pertinent past surgical history.     Family History:    History reviewed. No pertinent family history.      Social History:  The patient was accompanied to the ED by EMS.  Smoking Status: Current every day smoker, 0.5 PPD  Smokeless Tobacco: Very little  Alcohol Use: No   Marital Status:  Single     Review of Systems   Musculoskeletal: Negative for arthralgias and neck pain.        Pain in her left anterior shin     Skin: Positive for wound.   Neurological: Negative for numbness and headaches.   All other systems reviewed and are negative.    Physical Exam   Patient Vitals for the past 24 hrs:   BP Temp Temp src Heart Rate Resp SpO2 Height   12/06/17 0400 112/73 - - - - 100 % -   12/06/17 0330 123/66 - - - - - -   12/06/17 0319 106/68 - - 88 - 100 % -   12/06/17 0245 - - - - - 96 % -   12/06/17 0218 (!) 132/91 97.5  F (36.4  C) Oral 100 18 100 % 1.715 m (5' 7.5\")     Physical Exam   Nursing note and vitals reviewed.  Constitutional: "  Appears well-developed and well-nourished. She is tearful and appears to be in pain  HENT:   Head:    Atraumatic.   Mouth/Throat:   Oropharynx is clear and moist. No oropharyngeal exudate.   Eyes:    Pupils are equal, round, and reactive to light.   Neck:    Normal range of motion. Neck supple.      No tracheal deviation present. No thyromegaly present.   Cardiovascular:  Normal rate, regular rhythm, no murmur   Pulmonary/Chest: Breath sounds are clear and equal without wheezes or crackles.  Abdominal:   Soft. Bowel sounds are normal. Exhibits no distension and      no mass. There is no tenderness.      There is no rebound and no guarding.   Musculoskeletal:  Left leg exam: There is swelling ant tenderness to the distal tibia and the proximal fibula. Ankle, foot and hip are nontender., There are no breaks to the skin. The muscle compartments are soft. Good dorsalis, pedis, and posterior tibial pulses. Sensation intact distally. No knee joint effusion. ROM of the toes, foot and ankle intact. Right leg and both arms nontender.   Back:   Back nontender   Lymphadenopathy:  No cervical adenopathy.   Neurological:   Alert and oriented to person, place, and time. GCS 15.  CN 2-12 intact.  and proximal upper extremity strength strong and equal.  Bilateral lower extremity strength strong and equal, including strong dorsiflexion and plantarflexion strength.  Sensation intact and equal to the face, arms and legs.  No facial droop or weakness. Normal speech.  Follows commands and answers questions normally.    Skin:    Skin is warm and dry. No rash noted. No pallor.      Emergency Department Course     Imaging:  Radiology findings were communicated with the patient who voiced understanding of the findings.    Tib/Fib XR, left 1:  IMPRESSION: Oblique, mildly displaced fracture of the distal tibial  shaft. There is also a minimally displaced fracture of the fibula  Head.  Report per radiology     Procedures:    Narrative:  Left Lower Extremity Splint Placement     Long leg posterior and stirrup plaster splint was applied and after placement I checked and adjusted the fit to ensure proper positioning. Patient was more comfortable with splint in place. Sensation and circulation are intact after splint placement.    Interventions:  0233 - Dilaudid 0.5mg IV   0238 - Dilaudid 0.5mg IV   0320 - NS Bolus 1,000mL IV   0324 - Dilaudid 0.5mg IV   0349 - Dilaudid 0.5mg IV   0427 - Toradol 30mg IV      Emergency Department Course:  Nursing notes and vitals reviewed.  The patient was sent for a Tib/Fib XR, left 1 while in the emergency department, results above.   0229: I performed an exam of the patient as documented above.   0327: Patient rechecked and updated.   0410: I spoke with Dr. Schrader of Orthopedic Surgery regarding patient's presentation, findings, and plan of care.  0430: Patient rechecked and updated.   Findings and plan explained to the Patient. Patient discharged home with instructions regarding supportive care, medications, and reasons to return. The importance of close follow-up was reviewed. The patient was prescribed Percocet.  I personally reviewed the imaging results with the Patient and answered all related questions prior to discharge.    Impression & Plan      Medical Decision Making:  Marialuisa Streeter is a 32 year old female who EMS presents to the emergency department after a fall with leg pain. I found the patient to have a closed oblique mildly displaced fracture of the shaft of the tibia and a proximal fibula fracture from a torquing injury during her fall. She is neurovascularly intact and she does not have any signs of compartment syndrome and the fracture is closed. I discussed case with the Orthopedic Trauma Surgeon Dr. Husam Schrader and we felt that this could be treated best as an outpatient. Patient also wishes to go home and her pain is under good control. She was placed in a posterior and sugar  tongue long leg splint with plaster material and fitted for crutches and discharged to home. She plans to stay at her parent's house. She was told to call the [facility] in the morning to get set up for surgery and instructed on signs and symptoms to watch for including worsening pain, numbness and other signs of compartment syndrome. She was written for percocet for pain and told not to drive a car or drink alcohol for six hours after taking it.     Diagnosis:    ICD-10-CM    1. Closed displaced oblique fracture of shaft of left tibia, initial encounter S82.232A    2. Closed fracture of proximal end of left fibula, unspecified fracture morphology, initial encounter S82.832A    3. Alcoholic intoxication with complication (H) F10.929        Disposition:  Discharged to home.    Discharge Medications:  New Prescriptions    OXYCODONE-ACETAMINOPHEN (PERCOCET) 5-325 MG PER TABLET    Take 1-2 tablets by mouth every 4 hours as needed for moderate to severe pain       Scribe Disclosure:  Estefani PEERZ, am serving as a scribe at 2:29 AM on 12/6/2017 to document services personally performed by Tiffany Wesley MD based on my observations and the provider's statements to me.   12/6/2017    EMERGENCY DEPARTMENT       Tiffany Wesley MD  12/06/17 0609

## 2017-12-11 NOTE — ANESTHESIA CARE TRANSFER NOTE
Patient: Marialuisa Streeter    Procedure(s):  OPEN REDUCTION INTERNAL FIXATION INTRAMEDULLARY RODDING LEFT MID SHAFT TIBIA FRACTURE - Wound Class: I-Clean    Diagnosis: tibia shaft fracture, left   Diagnosis Additional Information: No value filed.    Anesthesia Type:   LMA, Periph. Nerve Block for postop pain     Note:  Airway :Face Mask  Patient transferred to:PACU  Comments: At end of procedure, spontaneous respirations, adequate tidal volumes, followed commands to voice, LMA removed atraumatically, airway patent after LMA removal. Oxygen via facemask at 6 liters per minute to PACU. Oxygen tubing connected to wall O2 in PACU, SpO2, NiBP, and EKG monitors and alarms on and functioning, Ritu Hugger warmer connected to patient gown, report on patient's clinical status given to PACU RN, RN questions answered.Handoff Report: Identifed the Patient, Identified the Reponsible Provider, Reviewed the pertinent medical history, Discussed the surgical course, Reviewed Intra-OP anesthesia mangement and issues during anesthesia, Set expectations for post-procedure period and Allowed opportunity for questions and acknowledgement of understanding      Vitals: (Last set prior to Anesthesia Care Transfer)    CRNA VITALS  12/11/2017 1418 - 12/11/2017 1456      12/11/2017             Resp Rate (observed): (!)  1                Electronically Signed By: ALEJANDRA Vallejo CRNA  December 11, 2017  2:56 PM

## 2017-12-11 NOTE — ANESTHESIA PROCEDURE NOTES
Peripheral nerve/Neuraxial procedure note : sciatic  Pre-Procedure  Performed by TATIANA JEAN  Referred by DAE REID  Location: pre-op      Pre-Anesthestic Checklist: patient identified, IV checked, site marked, risks and benefits discussed, informed consent, monitors and equipment checked, at physician/surgeon's request and post-op pain management    Timeout  Correct Patient: Yes   Correct Procedure: Yes   Correct Site: Yes   Correct Laterality: Yes   Correct Position: Yes   Site Marked: Yes   .   Procedure Documentation    .    Procedure:    Sciatic.  Local skin infiltrated with 5 mL of 1% lidocaine.     Ultrasound used to identify targeted nerve, plexus, or vascular marker and placed a needle adjacent to it., Ultrasound was used to visualize the spread of the anesthetic in close proximity to the above stated nerve. A permanent image is entered into the patient's record.  Patient Prep;mask, sterile gloves, chlorhexidine gluconate and isopropyl alcohol, patient draped.  Nerve Stim: Initial Level 1 mA. Lowest motor response mA..  Needle: insulated, short bevel Needle Gauge: 20.    Needle Length (Inches) 4  .       Assessment/Narrative  Paresthesias: No.  .  The placement was negative for: blood aspirated, painful injection and site bleeding.  Bolus given via needle..   Secured via.   Complications: none. Comments:  Sciatic Nerve Block - Popliteal Approach  30 ml 0.5% Ropivacaine with 1:400,000 Epinephrine

## 2017-12-11 NOTE — OP NOTE
DATE OF PROCEDURE:  12/11/2017      PREOPERATIVE DIAGNOSIS:  Left tibial and fibular shaft fracture.      POSTOPERATIVE DIAGNOSIS:  Left tibial and fibular shaft fracture.      PROCEDURE:  Open reduction and internal fixation with an intramedullary sena, left tibia fracture.      SURGEON:  Husam Schrader MD      FIRST ASSISTANT:  Emigdio Romero PA-C      PROCEDURE:  Marialuisa Streeter was brought to the operating room and given a sciatic and saphenous peripheral nerve block.  She was then given a general anesthetic and her left lower extremity was then prepped and draped in the usual sterile fashion.  The patient's leg was visualized using C-arm.  There was an oblique tibia fracture at the junction of the proximal 2/3 and distal 1/3 and a proximal fibula fracture near the fibular neck.  We were able to reduce the fracture reasonably well with internal rotation of the leg on the AP view, but on the lateral view it tended to displace into extension.  We decided to do an open reduction and hold the fracture with a clamp while we performed intramedullary sena fixation.  The left lower extremity was exsanguinated.  Tourniquet was inflated.  We made a small incision centered over the fracture.  This was carried down using blunt dissection down to the fracture site.  We were able to reduce the fracture anatomically and hold it reduced with a bone reduction clamp.  We then made an incision over the anterior aspect of the knee.  This was carried down through subcutaneous tissues down to the paratenon of the patellar tendon which was incised.  The patellar tendon was retracted laterally and we placed our guide pin into the starting point which was verified using C-arm and advanced the guide pin into the proximal tibia.  We verified this in good position and then overdrilled with a starter reamer.  The guide pin was then removed.  We placed a ball-tipped guide sena through this opening all the way down into the distal  tibia to the physeal scar distally.  We then sequentially reamed the tibia up to 11.5 mm.  We measured the length, and it appeared that a 360 mm long sena would fit nicely.  We placed a 10 x 360 sena down into the tibia with the proximal locking screw guide attached to it.  We decided to place 2 locking bolts proximally, one in the static slot and one in the dynamic slot.  These were placed through a small incision medially and placed in a medial to lateral direction.  The proximal locking screw guide was then removed.  We placed a 0 mm end cap into the sena.  We then turned our attention distally through 2 small stab incisions we placed 2 locking screws from the medial to lateral direction in the 2 medial to lateral locking holes distally under C-arm guidance.  The reduction of the fracture and the position of the hardware was then checked throughout the length of the tibia and was found to be satisfactory.  We irrigated the wounds thoroughly with a Betadine wash followed by normal saline. The tourniquet was deflated.  Hemostasis was obtained.  We closed the paratenon incision with interrupted 0 Vicryl sutures.  The skin incisions were closed with 2-0 Vicryl subcutaneous sutures and staples.  A sterile compressive dressing was applied to the left leg and the patient was then awakened from anesthesia and transferred to postanesthesia recovery in satisfactory condition.  It should be noted my assistant was necessary throughout the entire procedure to assist with retraction, positioning and assistance with the reaming.         ANSHU REID MD             D: 2017 14:38   T: 2017 17:46   MT: [id number]      Name:     EMMANUEL NAYLOR   MRN:      [id number]        Account:        [id number]   :      1985           Procedure Date: 2017      Document: [id number]

## 2017-12-11 NOTE — BRIEF OP NOTE
[facility] Brief Operative Note    Pre-operative diagnosis: tibia shaft fracture, left    Post-operative diagnosis tibia shaft fracture, left    Procedure: Procedure(s):  OPEN REDUCTION INTERNAL FIXATION INTRAMEDULLARY RODDING LEFT MID SHAFT TIBIA FRACTURE - Wound Class: I-Clean   Surgeon(s): Surgeon(s) and Role:     * Husam Schrader MD - Primary     * Emigdio Romero PA-C - Assisting   Estimated blood loss: * No values recorded between 12/11/2017  1:15 PM and 12/11/2017  2:31 PM *    Specimens: * No specimens in log *   Findings: tibia shaft fracture, left

## 2017-12-11 NOTE — IP AVS SNAPSHOT
[address] [facility]    [address] [place] [address]    Phone:  [phone]                                       After Visit Summary   12/11/2017    Marialuisa Streeter    MRN: [id number]           After Visit Summary Signature Page     I have received my discharge instructions, and my questions have been answered. I have discussed any challenges I see with this plan with the nurse or doctor.    ..........................................................................................................................................  Patient/Patient Representative Signature      ..........................................................................................................................................  Patient Representative Print Name and Relationship to Patient    ..................................................               ................................................  Date                                            Time    ..........................................................................................................................................  Reviewed by Signature/Title    ...................................................              ..............................................  Date                                                            Time

## 2017-12-11 NOTE — PROGRESS NOTES
Patient has her Ambien LAUREN and her Percocet with her (in their original bottles). She also brought her inhaler.

## 2017-12-11 NOTE — ANESTHESIA POSTPROCEDURE EVALUATION
Patient: Marialuisa Streeter    Procedure(s):  OPEN REDUCTION INTERNAL FIXATION INTRAMEDULLARY RODDING LEFT MID SHAFT TIBIA FRACTURE - Wound Class: I-Clean    Diagnosis:tibia shaft fracture, left   Diagnosis Additional Information: No value filed.    Anesthesia Type:  LMA, Periph. Nerve Block for postop pain    Note:  Anesthesia Post Evaluation    Patient location during evaluation: PACU  Patient participation: Able to fully participate in evaluation  Level of consciousness: awake  Pain management: adequate  Airway patency: patent  Cardiovascular status: acceptable  Respiratory status: acceptable  Hydration status: acceptable  PONV: none     Anesthetic complications: None          Last vitals:  Vitals:    12/11/17 1500 12/11/17 1510 12/11/17 1520   BP: 134/84 132/76 146/77   Pulse:      Resp: 18 19 14   Temp: 36.7  C (98.1  F) 36.7  C (98.1  F) 36.7  C (98.1  F)   SpO2: 100% 98% 95%         Electronically Signed By: Tiffany Huang MD  December 11, 2017  3:36 PM

## 2017-12-11 NOTE — IP AVS SNAPSHOT
MRN:[id number]                      After Visit Summary   12/11/2017    Marialuisa Streeter    MRN: [id number]           Thank you!     Thank you for choosing [facility] for your care. Our goal is always to provide you with excellent care. Hearing back from our patients is one way we can continue to improve our services. Please take a few minutes to complete the written survey that you may receive in the mail after you visit with us. Thank you!        Patient Information     Date Of Birth          1985        Designated Caregiver       Most Recent Value    Caregiver    Will someone help with your care after discharge? yes    Name of designated caregiver [place]    Phone number of caregiver [phone]    Caregiver address [address]      About your hospital stay     You were admitted on:  December 11, 2017 You last received care in the:  [place] [address] [facility]    You were discharged on:  December 12, 2017        Reason for your hospital stay       Diagnosis: Left tibia fracture  Procedure: Left tibia IM sena  Surgeon:  Husam Schrader MD  Patient underwent ORIF of a left tibia fracture without complication. Patient's hospital stay included physical therapy and DVT prophylaxis. Patient will follow-up in the office 10-14 days post-op for wound check. Please refer to chart for any other specifics of this hospital stay.    Henry Romero PA-C                  Who to Call     For medical emergencies, please call 911.  For non-urgent questions about your medical care, please call your primary care provider or clinic, [phone]  For questions related to your surgery, please call your surgery clinic        Attending Provider     Provider Specialty    Husam Schrader MD Orthopedics       Primary Care Provider Office Phone # Fax #    [facility] [phone]      After Care Instructions     Activity       Your  activity upon discharge: activity as tolerated with 50% weight bearing.            Diet       Follow this diet upon discharge: Orders Placed This Encounter      Regular Diet Adult            Diet Instructions       Resume pre-procedure diet            Discharge Instructions       Patient to follow up with appointment in 10-14 days.            Do not - immerse incision in water until sutures removed       Do not immerse incision in water until sutures removed            Dressing       Keep dressing clean and dry.  Change dressings daily.            Ice to affected area       Ice to operative site PRN            Shower       May shower daily between dressing changes            Weight bearing status - Partial       50% weight bearing to the left lower extremity                  Follow-up Appointments     Follow-up and recommended labs and tests        You will follow up with Dr. Schrader or his physician assistant Henry Romero in 2 weeks after surgery.  Your recovery process and incision will be checked.                  Future tests that were ordered for you     STANDARD WHEELCHAIR                 Further instructions from your care team       GENERAL GUIDELINES FOR CARE AT HOME FOLLOWING SURGERY  Your surgeon will answer any questions about your progress. General guidelines for your care are listed below. Your surgeon may give additional instructions for your care at home. Please follow them carefully.    Activity Level  1. Physical activity may be resumed gradually according to your comfort level and your surgeon s instructions.  2. Walking is usually important to your recovery. Increase your time and distance as you gain strength.  3. Allow yourself rest periods during the day. As you regain strength, you will need fewer rest periods.  4. Please check with your surgeon before you resume work or your normal activity level.    Good Health Practice  1. Maintain an adequate fluid intake and eat a well balanced  diet.  2. Be sure to include the basic food groups such as dairy products, meat/fish, vegetables, and fruit. Each of these foods contribute toyour wound heal and increasing your strength.  3. Surgery, decreased activity and pain medication all contribute to a decrease in bowel activity that can result in constipation. It is recommended that you increase your liquid intake, add fiber to your diet, increase activity, and decrease pain medication use. If you have any problems, notify your surgeon.    Incision and Dressing Care  1. Keep incision clean and dry. Cover incision if you are still having drainage.  2. Ask your surgeon if you may shower when you get home. After showering, be sure your incision is thoroughly dry before covering with a dressing or putting on clothes.    Things to Watch For  1. Notify your surgeon if there is redness or swelling near the incision,  or if you experience drainage, fever,or chilling episodes. This may  indicate an infection.  2. Pain or discomfort that is not relieved by Tylenol  or the pain  prescription the doctor gave you, should be discussed with your surgeon.    Follow up appointment ____________________________________________  Nurse signature ______________________ Date _________ Time ______  Patient signature ______________________________ Date _____________    Revised 01/2014        Pending Results     No orders found for last 3 day(s).            Statement of Approval     Ordered          12/12/17 1251  I have reviewed and agree with all the recommendations and orders detailed in this document.  EFFECTIVE NOW     Approved and electronically signed by:  Emigdio Romero PA-C             Admission Information     Date & Time Provider Department Dept. Phone    12/11/2017 Husam Schrader MD [place] [address] [facility] [phone]      Your Vitals Were     Blood Pressure Pulse Temperature Respirations Height Weight    111/73 (BP Location: Right arm)  "71 98.6  F (37  C) (Oral) 16 1.715 m (5' 7.5\") 90.7 kg (200 lb)    Last Period Pulse Oximetry BMI (Body Mass Index)             2017 95% 30.86 kg/m2         [facility] Information     [facility] lets you send messages to your doctor, view your test results, renew your prescriptions, schedule appointments and more. To sign up, go to [email or web address] . Click on \"Log in\" on the left side of the screen, which will take you to the Welcome page. Then click on \"Sign up Now\" on the right side of the page.     You will be asked to enter the access code listed below, as well as some personal information. Please follow the directions to create your username and password.     Your access code is: [email or web address]  Expires: 3/6/2018  4:46 AM     Your access code will  in 90 days. If you need help or a new code, please call your [place] clinic or [phone].        Care EveryWhere ID     This is your Care EveryWhere ID. This could be used by other organizations to access your [place] medical records  [id number]        Equal Access to Services     SANG ELDRIDGE AH: Abdoulaye Astudillo, wamariza camejo, qaybta kaalmada adelis, montrell cole. So [facility] [phone].    ATENCIÓN: Si habla español, tiene a garcia disposición servicios gratuitos de asistencia lingüística. Llame al [phone].    We comply with applicable federal civil rights laws and [place] laws. We do not discriminate on the basis of race, color, national origin, age, disability, sex, sexual orientation, or gender identity.               Review of your medicines      START taking        Dose / Directions    * oxyCODONE IR 5 MG tablet   Commonly known as:  ROXICODONE        Dose:  5-10 mg   Take 1-2 tablets (5-10 mg) by mouth every 3 hours as needed for moderate to severe pain   Quantity:  100 tablet   Refills:  0       * oxyCODONE 10 MG 12 hr tablet   Commonly known as:  OxyCONTIN        Dose:  10 mg   Take 1 tablet " (10 mg) by mouth every 12 hours   Quantity:  40 tablet   Refills:  0       * Notice:  This list has 2 medication(s) that are the same as other medications prescribed for you. Read the directions carefully, and ask your doctor or other care provider to review them with you.      CONTINUE these medicines which have NOT CHANGED        Dose / Directions    albuterol 108 (90 BASE) MCG/ACT Inhaler   Commonly known as:  PROAIR HFA/PROVENTIL HFA/VENTOLIN HFA        Dose:  1-2 puff   Inhale 1-2 puffs into the lungs 2 times daily as needed for shortness of breath / dyspnea or wheezing   Refills:  0       AMBIEN CR PO        Dose:  12.5 mg   Take 12.5 mg by mouth At Bedtime   Refills:  0       CHANTIX PO   Notes to Patient:  Resume at discharge          Dose:  2 mg   Take 2 mg by mouth daily   Refills:  0       diphenhydrAMINE 2 % cream   Commonly known as:  BENADRYL        Apply topically 3 times daily as needed for itching or irritation   Refills:  0       FLUOXETINE HCL PO        Dose:  60 mg   Take 60 mg by mouth daily (3 x 20 mg = 60 mg dose)   Refills:  0       VYVANSE PO   Notes to Patient:  Resume at discharge        Dose:  70 mg   Take 70 mg by mouth daily   Refills:  0       XANAX PO        Dose:  2-3 mg   Take 2-3 mg by mouth 3 times daily as needed for anxiety (2-3 x 1 mg tablet = 2-3 mg dose)   Refills:  0         STOP taking     oxyCODONE-acetaminophen 5-325 MG per tablet   Commonly known as:  PERCOCET                Where to get your medicines      Some of these will need a paper prescription and others can be bought over the counter. Ask your nurse if you have questions.     Bring a paper prescription for each of these medications     oxyCODONE 10 MG 12 hr tablet    oxyCODONE IR 5 MG tablet                Protect others around you: Learn how to safely use, store and throw away your medicines at www.disposemymeds.org.             Medication List: This is a list of all your medications and when to take them.  Check marks below indicate your daily home schedule. Keep this list as a reference.      Medications           Morning Afternoon Evening Bedtime As Needed    albuterol 108 (90 BASE) MCG/ACT Inhaler   Commonly known as:  PROAIR HFA/PROVENTIL HFA/VENTOLIN HFA   Inhale 1-2 puffs into the lungs 2 times daily as needed for shortness of breath / dyspnea or wheezing                                   AMBIEN CR PO   Take 12.5 mg by mouth At Bedtime   Last time this was given:  12.5 mg on 12/11/2017  8:37 PM                        12/12/17           CHANTIX PO   Take 2 mg by mouth daily   Notes to Patient:  Resume at discharge                                  diphenhydrAMINE 2 % cream   Commonly known as:  BENADRYL   Apply topically 3 times daily as needed for itching or irritation                                   FLUOXETINE HCL PO   Take 60 mg by mouth daily (3 x 20 mg = 60 mg dose)   Last time this was given:  60 mg on 12/12/2017  8:12 AM            12/13/17                       * oxyCODONE IR 5 MG tablet   Commonly known as:  ROXICODONE   Take 1-2 tablets (5-10 mg) by mouth every 3 hours as needed for moderate to severe pain   Last time this was given:  10 mg on 12/12/2017  1:15 PM                                   * oxyCODONE 10 MG 12 hr tablet   Commonly known as:  OxyCONTIN   Take 1 tablet (10 mg) by mouth every 12 hours   Last time this was given:  10 mg on 12/12/2017  8:44 AM                    12/12/17               VYVANSE PO   Take 70 mg by mouth daily   Notes to Patient:  Resume at discharge                                XANAX PO   Take 2-3 mg by mouth 3 times daily as needed for anxiety (2-3 x 1 mg tablet = 2-3 mg dose)   Last time this was given:  2 mg on 12/12/2017  3:39 AM                                   * Notice:  This list has 2 medication(s) that are the same as other medications prescribed for you. Read the directions carefully, and ask your doctor or other care provider to review them with you.

## 2017-12-11 NOTE — PROGRESS NOTES
Admission medication history interview status for the 12/11/2017  admission is complete. See EPIC admission navigator for prior to admission medications     Medication history source reliability:Good    Medication history interview source(s):Patient    Medication history resources (including written lists, pill bottles, clinic record):Patient brought a list from home    Primary pharmacy.[facility]    Additional medication history information not noted on PTA med list :None    Time spent in this activity: 45 minutes    Prior to Admission medications    Medication Sig Last Dose Taking? Auth Provider   Varenicline Tartrate (CHANTIX PO) Take 2 mg by mouth daily 12/10/2017 at 0900 Yes Reported, Patient   FLUOXETINE HCL PO Take 60 mg by mouth daily (3 x 20 mg = 60 mg dose) 12/5/2017 at 0900 Yes Reported, Patient   Lisdexamfetamine Dimesylate (VYVANSE PO) Take 70 mg by mouth daily 12/5/2017 at 0900 Yes Reported, Patient   ALPRAZolam (XANAX PO) Take 2-3 mg by mouth 3 times daily as needed for anxiety (2-3 x 1 mg tablet = 2-3 mg dose) 11/26/2017 at PRN Yes Reported, Patient   Zolpidem Tartrate (AMBIEN CR PO) Take 12.5 mg by mouth At Bedtime 12/10/2017 at HS Yes Reported, Patient   oxyCODONE-acetaminophen (PERCOCET) 5-325 MG per tablet Take 2 tablets by mouth every 4 hours 12/11/2017 at AM Yes Reported, Patient   albuterol (PROAIR HFA/PROVENTIL HFA/VENTOLIN HFA) 108 (90 BASE) MCG/ACT Inhaler Inhale 1-2 puffs into the lungs 2 times daily as needed for shortness of breath / dyspnea or wheezing 12/10/2017 at PRN Yes Reported, Patient   diphenhydrAMINE (BENADRYL) 2 % cream Apply topically 3 times daily as needed for itching or irritation 12/10/2017 at HS Yes Reported, Patient

## 2017-12-12 ENCOUNTER — APPOINTMENT (OUTPATIENT)
Dept: PHYSICAL THERAPY | Facility: CLINIC | Age: 32
End: 2017-12-12
Attending: ORTHOPAEDIC SURGERY
Payer: COMMERCIAL

## 2017-12-12 VITALS
RESPIRATION RATE: 16 BRPM | WEIGHT: 200 LBS | OXYGEN SATURATION: 95 % | HEART RATE: 71 BPM | HEIGHT: 68 IN | BODY MASS INDEX: 30.31 KG/M2 | SYSTOLIC BLOOD PRESSURE: 111 MMHG | TEMPERATURE: 98.6 F | DIASTOLIC BLOOD PRESSURE: 73 MMHG

## 2017-12-12 LAB — GLUCOSE BLDC GLUCOMTR-MCNC: 106 MG/DL (ref 70–99)

## 2017-12-12 PROCEDURE — 25000128 H RX IP 250 OP 636: Performed by: ORTHOPAEDIC SURGERY

## 2017-12-12 PROCEDURE — 97161 PT EVAL LOW COMPLEX 20 MIN: CPT | Mod: GP

## 2017-12-12 PROCEDURE — 97530 THERAPEUTIC ACTIVITIES: CPT | Mod: GP

## 2017-12-12 PROCEDURE — 97116 GAIT TRAINING THERAPY: CPT | Mod: GP

## 2017-12-12 PROCEDURE — 25000132 ZZH RX MED GY IP 250 OP 250 PS 637: Performed by: ORTHOPAEDIC SURGERY

## 2017-12-12 PROCEDURE — 82962 GLUCOSE BLOOD TEST: CPT

## 2017-12-12 PROCEDURE — 25000132 ZZH RX MED GY IP 250 OP 250 PS 637: Performed by: PHYSICIAN ASSISTANT

## 2017-12-12 PROCEDURE — 40000193 ZZH STATISTIC PT WARD VISIT

## 2017-12-12 RX ORDER — OXYCODONE HCL 10 MG/1
10 TABLET, FILM COATED, EXTENDED RELEASE ORAL EVERY 12 HOURS
Status: DISCONTINUED | OUTPATIENT
Start: 2017-12-12 | End: 2017-12-12 | Stop reason: HOSPADM

## 2017-12-12 RX ORDER — OXYCODONE HYDROCHLORIDE 5 MG/1
5-10 TABLET ORAL
Qty: 100 TABLET | Refills: 0 | Status: ON HOLD | OUTPATIENT
Start: 2017-12-12 | End: 2019-09-11

## 2017-12-12 RX ORDER — OXYCODONE HCL 10 MG/1
10 TABLET, FILM COATED, EXTENDED RELEASE ORAL EVERY 12 HOURS
Qty: 40 TABLET | Refills: 0 | Status: ON HOLD | OUTPATIENT
Start: 2017-12-12 | End: 2019-09-11

## 2017-12-12 RX ADMIN — FLUOXETINE 60 MG: 20 CAPSULE ORAL at 08:12

## 2017-12-12 RX ADMIN — Medication 0.5 MG: at 03:39

## 2017-12-12 RX ADMIN — ACETAMINOPHEN 650 MG: 325 TABLET, FILM COATED ORAL at 08:44

## 2017-12-12 RX ADMIN — Medication 0.5 MG: at 08:13

## 2017-12-12 RX ADMIN — Medication 0.5 MG: at 05:30

## 2017-12-12 RX ADMIN — Medication 0.5 MG: at 09:28

## 2017-12-12 RX ADMIN — OXYCODONE HYDROCHLORIDE 10 MG: 10 TABLET, FILM COATED, EXTENDED RELEASE ORAL at 08:44

## 2017-12-12 RX ADMIN — OXYCODONE HYDROCHLORIDE 10 MG: 5 TABLET ORAL at 07:09

## 2017-12-12 RX ADMIN — HYDROXYZINE HYDROCHLORIDE 25 MG: 25 TABLET ORAL at 14:05

## 2017-12-12 RX ADMIN — ALPRAZOLAM 2 MG: 1 TABLET ORAL at 03:39

## 2017-12-12 RX ADMIN — OXYCODONE HYDROCHLORIDE 10 MG: 5 TABLET ORAL at 00:53

## 2017-12-12 RX ADMIN — OXYCODONE HYDROCHLORIDE 10 MG: 5 TABLET ORAL at 04:04

## 2017-12-12 RX ADMIN — HYDROXYZINE HYDROCHLORIDE 25 MG: 25 TABLET ORAL at 07:09

## 2017-12-12 RX ADMIN — OXYCODONE HYDROCHLORIDE 10 MG: 5 TABLET ORAL at 10:12

## 2017-12-12 RX ADMIN — Medication 0.5 MG: at 02:08

## 2017-12-12 RX ADMIN — OXYCODONE HYDROCHLORIDE 10 MG: 5 TABLET ORAL at 13:15

## 2017-12-12 NOTE — PROGRESS NOTES
"[facility] Orthopedic Post-Op Progress Note    Marialuisa Streeter MRN# [id number]   YOB: 1985 Age: 32 year old            Interval History:   1 Day Post-Op from left tibia IM rodding  Stable.  Doing well.  Improving slowly.  Having a lot of pain this AM.  Denies any numbness, tingling in left lower foot and toes.  No fevers.  X-rays reviewed.  Starting physical therapy this AM, 50% weight bearing.            Physical Exam:   /65 (BP Location: Right arm)  Pulse 77  Temp 97.8  F (36.6  C) (Oral)  Resp 21  Ht 1.715 m (5' 7.5\")  Wt 90.7 kg (200 lb)  LMP 11/27/2017  SpO2 97%  BMI 30.86 kg/m2    Dressing currently in place with ACE bandage, no drainge.  Moderate swelling.  Movement and sensation intact distal to surgical site.  Left lower leg compartments soft with mild tenderness.  Passive dorsiflexion and plantarflexion of ankle and toes with minimal pain.  Calves non-tender.           Data:     Hemoglobin   Date Value Ref Range Status   05/13/2017 13.4 11.7 - 15.7 g/dL Final   12/28/2007 14.1 11.7 - 15.7 g/dL Final   ]         Assessment and Plan:    Assessment:   Post-operative day #1  Left tibia IM rodding     Doing well.  No excessive bleeding           Plan:   Continue current medical management  Start working with physical therapy, 50% weight bearing  Added oxycontin to pain management, continue to monitor.  Consider adding vistaril or valium if continued pain  Continue to monitor left lower extremity compartments  Will order orthotist consult for placement of a tall walking boot  Plan on discharge later today  Continue current DVT prophylaxis           Henry Romero PA-C  "

## 2017-12-12 NOTE — PLAN OF CARE
Problem: Patient Care Overview  Goal: Plan of Care/Patient Progress Review  PT: Orders received, chart reviewed, eval completed and treatment provided. Pt is a 33 y/o female, admitted post mechanical fall with resultant L tibial and fibular shaft fx, now POD1 L tibia ORIF with intramedullary rode. To maintain 50% WBing to L LE with Cam walker donned. Pt is IND at baseline, works 2 jobs, taking time off from both, will be staying with her parents and have assist for driving, has a walker, axillary crutches already and a prescription for a wheel chair (discussed with SW).    Discharge Planner PT   Patient plan for discharge: home with parents, assist as needed  Current status: Pt completes supine<>sit with CGA at L heel for pain control with Cam donned. IND with sit<>stand. Ambulates x50' Demarco with FWW and completes again with B axillary crutches. No LOB or unsteadiness noted. Pt able to maintain 50% WBing throughout, does maintain NWB occasionally 2/2 pain. Education re: rest, elevation and activity provided. Pt and family questions answered. No further skilled needs, will complete orders.  Barriers to return to prior living situation: none to home, has assist; reports dad to carry her over the 2 stairs into the home  Recommendations for discharge: home with assist  Rationale for recommendations: no further skilled PT needs identified; may benefit from OP PT for strength and ROM, when WBing restrictions progressed, as needed       Entered by: Ruth Gómez 12/12/2017 12:03 PM

## 2017-12-12 NOTE — PLAN OF CARE
Problem: Patient Care Overview  Goal: Plan of Care/Patient Progress Review  Outcome: No Change  A/O x 4.  VSS on 2 LPM NC; O2 sats > 92 %; capnography in place.  BG--106.  LLE--dressing C/D/I; elevated.  C/O L knee pain rating at 8-10/10; general nerve block wore off in the early morning; pain not controlled with PO oxycodone 10 mg Q3H; given IV dilaudid x 3; Spoke with Dr. Schrader about pain control; okay to give a couple more doses of IV dilaudid, PA will be in to speak with pt. This AM.   POD 1.  Weight bearing 50 %.  Denies nausea, vomiting, or SOB.  Regular diet tolerated.  Possible D/C today.

## 2017-12-12 NOTE — PROGRESS NOTES
12/12/17 1100   Quick Adds   Type of Visit Initial PT Evaluation   Living Environment   Lives With alone   Living Environment Comment pt will be staying with her parents at discharge, will have assist for driving and reports her dad will carry her up the 2 stairs into the home, all needs then met on 1 level   Self-Care   Dominant Hand right   Usual Activity Tolerance excellent   Current Activity Tolerance good   Regular Exercise no   Equipment Currently Used at Home none   Activity/Exercise/Self-Care Comment has FWW and axillary crutches to use; has prescription for w/c, SW to assist   Functional Level Prior   Ambulation 0-->independent   Transferring 0-->independent   Toileting 0-->independent   Bathing 0-->independent   Dressing 0-->independent   Eating 0-->independent   Communication 0-->understands/communicates without difficulty   Swallowing 0-->swallows foods/liquids without difficulty   Cognition 0 - no cognition issues reported   Fall history within last six months yes   Number of times patient has fallen within last six months 1   Which of the above functional risks had a recent onset or change? ambulation;fall history   General Information   Onset of Illness/Injury or Date of Surgery - Date 12/11/17   Referring Physician Husam Schrader MD   Patient/Family Goals Statement home with assist   Pertinent History of Current Problem (include personal factors and/or comorbidities that impact the POC) POD1 L tibia ORIF with intramedullary rode. To maintain 50% WBing to L LE with Cam walker donned.    Precautions/Limitations fall precautions   Weight-Bearing Status - LLE partial weight-bearing (% in comments)  (50%)   General Info Comments Activity: up ad michela   Cognitive Status Examination   Orientation orientation to person, place and time   Level of Consciousness alert   Follows Commands and Answers Questions 100% of the time;able to follow multistep instructions   Personal Safety and Judgment intact  "  Memory intact   Pain Assessment   Patient Currently in Pain Yes, see Vital Sign flowsheet  (6-7/10)   Integumentary/Edema   Integumentary/Edema Comments L LE wrapped   Posture    Posture Not impaired   Range of Motion (ROM)   ROM Comment R LE WFL; L killian and knee not assessed   Strength   Strength Comments WFL; pt able to complete functional mobility without assist, L LE movement limited by pain only   Bed Mobility   Bed Mobility Comments pt completes supine to sit with CGA at heel for pain control only, able to demo IND but with increased pain   Transfer Skills   Transfer Comments pt completes sit<>stand IND, demos NWB to L LE, able to demo with 50% WBing appropriately   Gait   Gait Comments pt ambulates with FWW with SBA, maintain s50% WB, decreased carola   Balance   Balance Comments no overt LOB or unsteadiness with use of AD with mobility   Sensory Examination   Sensory Perception Comments pt reports numbness post op has resolved   General Therapy Interventions   Planned Therapy Interventions gait training;transfer training   Intervention Comments eval and 1x treat   Clinical Impression   Criteria for Skilled Therapeutic Intervention yes, treatment indicated   PT Diagnosis difficulty with gait   Influenced by the following impairments pain,WBing status   Functional limitations due to impairments difficulty with bed mobiltiy, gait, transfers   Clinical Presentation Stable/Uncomplicated   Clinical Presentation Rationale limited by pain but able to demo increased IND with AD   Clinical Decision Making (Complexity) Low complexity   Predicted Duration of Therapy Intervention (days/wks) eval and 1x treat   Anticipated Equipment Needs at Discharge (has FWW, crutches and will have w/c)   Anticipated Discharge Disposition Home with Assist   Risk & Benefits of therapy have been explained Yes   Patient, Family & other staff in agreement with plan of care Yes   [facility] AM-PAC TM \"6 Clicks\"   2016, Trustees of " "[facility], under license to [employer].  All rights reserved.   6 Clicks Short Forms Basic Mobility Inpatient Short Form   [facility] AM-PAC  \"6 Clicks\" V.2 Basic Mobility Inpatient Short Form   1. Turning from your back to your side while in a flat bed without using bedrails? 4 - None   2. Moving from lying on your back to sitting on the side of a flat bed without using bedrails? 3 - A Little   3. Moving to and from a bed to a chair (including a wheelchair)? 4 - None   4. Standing up from a chair using your arms (e.g., wheelchair, or bedside chair)? 4 - None   5. To walk in hospital room? 4 - None   6. Climbing 3-5 steps with a railing? 1 - Total  (reports dad to assist)   Basic Mobility Raw Score (Score out of 24.Lower scores equate to lower levels of function) 20   Total Evaluation Time   Total Evaluation Time (Minutes) 10     "

## 2017-12-12 NOTE — PLAN OF CARE
Problem: Patient Care Overview  Goal: Plan of Care/Patient Progress Review  Outcome: Improving  [id number]: pt alert and oriented. LLE no sensation and edema +2. Pain 7/10 taking oxycodone for pain. IV SL. 50% WB. Will continue to monitor.

## 2017-12-12 NOTE — PROGRESS NOTES
S.  Patient was seen today at 532-02 for a left Camwalker as ordered.  O/G. help stabilize foot and ankle.  A.  Patient was fit with a medium tall pneumatic Cam Walker for the left leg.  Cam walker was assembled by removing the soft liner from the foot plate and uprights, the patients foot and leg was positioned into the soft liner with the heel firmly sealed.  The liner was closed tightly and secured with the Velcro closure. The heel and foot were positioned in the rocker bottom foot plate and the uprights were bent out to accommodate the shape of the lower leg.  The foot plate Velcro straps were secured, proximal straps first, then the distal strap.  The lower leg straps were attached starting distal and working proximal. The ankle was set at 90 degrees of dorsi/plantar flexion. Donning and doffing of the Cam Walker was explained.  P.   Patient was advised to contact this office with any problems or questions.

## 2017-12-12 NOTE — PLAN OF CARE
Problem: Patient Care Overview  Goal: Plan of Care/Patient Progress Review  Outcome: Improving  A/Ox4. VSS. Up to commode with assist x1. Pt complained of severe pain this morning and pain was in more control by the afternoon. Pt felt comfortable discharging. Pt refused capnography at 1100. Review discharge instructions with patient and mom, all questions answered.

## 2017-12-12 NOTE — PROGRESS NOTES
AAYUSH    D: AAYUSH was consulted for assistance ordering a wheelchair. Patient's parents had contacted [facility], and also contacted [employer] to see about coverage. Per [employer], as long as a DME medical review form is filled out by a MD and faxed to [facility], it should be covered as patient has met her OOP deductible. AAYUSH spoke to Ruth at [facility] regarding this information as well.     P: Continue to follow.

## 2017-12-12 NOTE — DISCHARGE INSTRUCTIONS
GENERAL GUIDELINES FOR CARE AT HOME FOLLOWING SURGERY  Your surgeon will answer any questions about your progress. General guidelines for your care are listed below. Your surgeon may give additional instructions for your care at home. Please follow them carefully.    Activity Level  1. Physical activity may be resumed gradually according to your comfort level and your surgeon s instructions.  2. Walking is usually important to your recovery. Increase your time and distance as you gain strength.  3. Allow yourself rest periods during the day. As you regain strength, you will need fewer rest periods.  4. Please check with your surgeon before you resume work or your normal activity level.    Good Health Practice  1. Maintain an adequate fluid intake and eat a well balanced diet.  2. Be sure to include the basic food groups such as dairy products, meat/fish, vegetables, and fruit. Each of these foods contribute toyour wound heal and increasing your strength.  3. Surgery, decreased activity and pain medication all contribute to a decrease in bowel activity that can result in constipation. It is recommended that you increase your liquid intake, add fiber to your diet, increase activity, and decrease pain medication use. If you have any problems, notify your surgeon.    Incision and Dressing Care  1. Keep incision clean and dry. Cover incision if you are still having drainage.  2. Ask your surgeon if you may shower when you get home. After showering, be sure your incision is thoroughly dry before covering with a dressing or putting on clothes.    Things to Watch For  1. Notify your surgeon if there is redness or swelling near the incision,  or if you experience drainage, fever,or chilling episodes. This may  indicate an infection.  2. Pain or discomfort that is not relieved by Tylenol  or the pain  prescription the doctor gave you, should be discussed with your surgeon.    Follow up appointment  ____________________________________________  Nurse signature ______________________ Date _________ Time ______  Patient signature ______________________________ Date _____________    Revised 01/2014

## 2017-12-18 NOTE — DISCHARGE SUMMARY
[facility]    Discharge Summary  Orthopedics    Date of Admission:  12/11/2017  Date of Discharge:  12/12/2017  2:58 PM  Discharging Provider: Emigdio Romero PA-C  Date of Service: 12/12/2017    Discharge Diagnoses   Closed fracture of shaft of left tibia and fibula with routine healing, subsequent encounter    Procedure/Surgery Information   Procedure: Procedure(s):  OPEN REDUCTION INTERNAL FIXATION INTRAMEDULLARY RODDING LEFT MID SHAFT TIBIA FRACTURE - Wound Class: I-Clean   Surgeon(s): Surgeon(s) and Role:     * Husam Schrader MD - Primary     * Emigdio Romero PA-C - Assisting           History of Present Illness   Marialuisa Streeter is a 32 year old female who presented with significant pain and fracture in her left tibia.    Hospital Course   Marialuisa Streeter was admitted on 12/11/2017.  The following problems were addressed during her hospitalization:  Active Problems:    Tibia/fibula fracture, shaft      Post-operative pain control: included oxycodone and will be Oxycodone on discharge.     Medications discontinued or adjusted during this hospitalization:  None.    Antibiotics prescribed at discharge: None prescribed     Emigdio Romero PA-C    Discharge Disposition   Discharged to home   Condition at discharge: Good    Unresulted Labs Ordered in the Past 30 Days of this Admission     No orders found from 10/12/2017 to 12/12/2017.          Primary Care Physician   [facility]    Consultations This Hospital Stay   PHYSICAL THERAPY ADULT IP CONSULT  ORTHOSIS EXTREMITY LOWER REFERRAL IP CONSULT  SOCIAL WORK IP CONSULT    Time Spent on this Encounter   I have spent less than 30 minutes on this discharge.    Discharge Orders     STANDARD WHEELCHAIR     Weight bearing status - Partial   50% weight bearing to the left lower extremity     Diet Instructions   Resume pre-procedure diet     Shower   May shower daily between dressing changes      Do not - immerse incision in water until sutures removed   Do not immerse incision in water until sutures removed     Dressing   Keep dressing clean and dry.  Change dressings daily.     Ice to affected area   Ice to operative site PRN     Discharge Instructions   Patient to follow up with appointment in 10-14 days.     Reason for your hospital stay   Diagnosis: Left tibia fracture  Procedure: Left tibia IM sena  Surgeon:  Husam Schrader MD  Patient underwent ORIF of a left tibia fracture without complication. Patient's hospital stay included physical therapy and DVT prophylaxis. Patient will follow-up in the office 10-14 days post-op for wound check. Please refer to chart for any other specifics of this hospital stay.    Henry Romero PA-C     Activity   Your activity upon discharge: activity as tolerated with 50% weight bearing.     Follow-up and recommended labs and tests    You will follow up with Dr. Schrader or his physician assistant Henry Romero in 2 weeks after surgery.  Your recovery process and incision will be checked.     Full Code     Diet   Follow this diet upon discharge: Orders Placed This Encounter     Regular Diet Adult       Discharge Medications      Discharge Medication List as of 12/12/2017  2:36 PM      START taking these medications    Details   oxyCODONE IR (ROXICODONE) 5 MG tablet Take 1-2 tablets (5-10 mg) by mouth every 3 hours as needed for moderate to severe pain, Disp-100 tablet, R-0, Local Print      oxyCODONE (OXYCONTIN) 10 MG 12 hr tablet Take 1 tablet (10 mg) by mouth every 12 hours, Disp-40 tablet, R-0, Local Print         CONTINUE these medications which have NOT CHANGED    Details   Varenicline Tartrate (CHANTIX PO) Take 2 mg by mouth daily, Historical      FLUOXETINE HCL PO Take 60 mg by mouth daily (3 x 20 mg = 60 mg dose), Historical      Lisdexamfetamine Dimesylate (VYVANSE PO) Take 70 mg by mouth daily, Historical      ALPRAZolam (XANAX PO) Take 2-3 mg by mouth  3 times daily as needed for anxiety (2-3 x 1 mg tablet = 2-3 mg dose), Historical      Zolpidem Tartrate (AMBIEN CR PO) Take 12.5 mg by mouth At Bedtime, Historical      albuterol (PROAIR HFA/PROVENTIL HFA/VENTOLIN HFA) 108 (90 BASE) MCG/ACT Inhaler Inhale 1-2 puffs into the lungs 2 times daily as needed for shortness of breath / dyspnea or wheezing, Historical      diphenhydrAMINE (BENADRYL) 2 % cream Apply topically 3 times daily as needed for itching or irritationHistorical         STOP taking these medications       oxyCODONE-acetaminophen (PERCOCET) 5-325 MG per tablet Comments:   Reason for Stopping:             Allergies   Allergies   Allergen Reactions     Amoxicillin Hives     Ceftin [Cefuroxime] Hives     Data   Results for orders placed or performed during the hospital encounter of 12/11/17   XR Surgery MARIA INES L/T 5 Min Fluoro w Stills    Narrative    SURGERY C-ARM FLUOROSCOPY LESS THAN FIVE MINUTES WITH STILLS   12/11/2017 2:18 PM     HISTORY: Left tibia rodding. [id number]. 1 min and 31 sec fluoro time.  8 images. C-Arm #3.    COMPARISON: None.      Impression    IMPRESSION: A total of 8 spot fluoroscopic images were obtained during  the procedure. Intramedullary sena noted throughout the tibia. Fracture  or fractures identified. Total fluoroscopic time was 1.6 minutes.    LALITO POWELL MD     Hemoglobin   Date Value Ref Range Status   05/13/2017 13.4 11.7 - 15.7 g/dL Final   12/28/2007 14.1 11.7 - 15.7 g/dL Final   ]  Last Basic Metabolic Panel:  Lab Results   Component Value Date     05/13/2017      Lab Results   Component Value Date    POTASSIUM 3.9 05/13/2017     Lab Results   Component Value Date    CHLORIDE 108 05/13/2017     Lab Results   Component Value Date    MAGGIE 9.4 05/13/2017     Lab Results   Component Value Date    CO2 29 05/13/2017     Lab Results   Component Value Date    BUN 19 05/13/2017     Lab Results   Component Value Date    CR 0.88 05/13/2017     Lab Results   Component Value  Date    GLC 80 05/13/2017

## 2017-12-20 ENCOUNTER — TRANSFERRED RECORDS (OUTPATIENT)
Dept: HEALTH INFORMATION MANAGEMENT | Facility: CLINIC | Age: 32
End: 2017-12-20

## 2017-12-27 ENCOUNTER — TRANSFERRED RECORDS (OUTPATIENT)
Dept: HEALTH INFORMATION MANAGEMENT | Facility: CLINIC | Age: 32
End: 2017-12-27

## 2018-03-19 NOTE — ED NOTES
"Received email from Sonali @ [facility] stating that  states covers can be ordered however  size is needed for stump covers -   CM had member measure - 19 1/2\" bottom and 22\" groin.  Sent information to Sonali @ [facility].     Jamie Sharma RN, N  [employer]      " Report given to Concepción STANLEY at [facility].  Aware pt will transport via Ambulance.

## 2019-02-26 LAB
ABO + RH BLD: NORMAL
ABO + RH BLD: NORMAL
HBV SURFACE AG SERPL QL IA: NEGATIVE
RUBELLA ANTIBODY IGG QUANTITATIVE: NORMAL IU/ML

## 2019-07-15 ENCOUNTER — TRANSFERRED RECORDS (OUTPATIENT)
Dept: HEALTH INFORMATION MANAGEMENT | Facility: CLINIC | Age: 34
End: 2019-07-15

## 2019-08-20 LAB — GROUP B STREP PCR: NEGATIVE

## 2019-09-10 ENCOUNTER — HOSPITAL ENCOUNTER (INPATIENT)
Facility: CLINIC | Age: 34
LOS: 2 days | Discharge: HOME | End: 2019-09-13
Attending: OBSTETRICS & GYNECOLOGY | Admitting: OBSTETRICS & GYNECOLOGY
Payer: COMMERCIAL

## 2019-09-10 PROCEDURE — 59025 FETAL NON-STRESS TEST: CPT

## 2019-09-10 PROCEDURE — G0463 HOSPITAL OUTPT CLINIC VISIT: HCPCS | Mod: 25

## 2019-09-10 RX ORDER — ONDANSETRON 2 MG/ML
4 INJECTION INTRAMUSCULAR; INTRAVENOUS EVERY 6 HOURS PRN
Status: DISCONTINUED | OUTPATIENT
Start: 2019-09-10 | End: 2019-09-13 | Stop reason: HOSPADM

## 2019-09-10 RX ORDER — QUETIAPINE FUMARATE 50 MG/1
50 TABLET, FILM COATED ORAL AT BEDTIME
COMMUNITY

## 2019-09-10 RX ORDER — PRENATAL VIT/IRON FUM/FOLIC AC 27MG-0.8MG
1 TABLET ORAL DAILY
COMMUNITY

## 2019-09-11 ENCOUNTER — ANESTHESIA (OUTPATIENT)
Dept: OBGYN | Facility: CLINIC | Age: 34
End: 2019-09-11
Payer: COMMERCIAL

## 2019-09-11 ENCOUNTER — ANESTHESIA EVENT (OUTPATIENT)
Dept: OBGYN | Facility: CLINIC | Age: 34
End: 2019-09-11
Payer: COMMERCIAL

## 2019-09-11 LAB
ABO + RH BLD: NORMAL
ABO + RH BLD: NORMAL
BLD GP AB SCN SERPL QL: NORMAL
BLOOD BANK CMNT PATIENT-IMP: NORMAL
RUPTURE OF FETAL MEMBRANES BY ROM PLUS: POSITIVE
SPECIMEN EXP DATE BLD: NORMAL
T PALLIDUM AB SER QL: NONREACTIVE

## 2019-09-11 PROCEDURE — 25000125 ZZHC RX 250: Performed by: ANESTHESIOLOGY

## 2019-09-11 PROCEDURE — 25000132 ZZH RX MED GY IP 250 OP 250 PS 637: Performed by: OBSTETRICS & GYNECOLOGY

## 2019-09-11 PROCEDURE — 12000035 ZZH R&B POSTPARTUM

## 2019-09-11 PROCEDURE — 00HU33Z INSERTION OF INFUSION DEVICE INTO SPINAL CANAL, PERCUTANEOUS APPROACH: ICD-10-PCS | Performed by: ANESTHESIOLOGY

## 2019-09-11 PROCEDURE — 25800030 ZZH RX IP 258 OP 636: Performed by: OBSTETRICS & GYNECOLOGY

## 2019-09-11 PROCEDURE — 37000011 ZZH ANESTHESIA WARD SERVICE

## 2019-09-11 PROCEDURE — 86901 BLOOD TYPING SEROLOGIC RH(D): CPT | Performed by: OBSTETRICS & GYNECOLOGY

## 2019-09-11 PROCEDURE — 36415 COLL VENOUS BLD VENIPUNCTURE: CPT | Performed by: OBSTETRICS & GYNECOLOGY

## 2019-09-11 PROCEDURE — 86900 BLOOD TYPING SEROLOGIC ABO: CPT | Performed by: OBSTETRICS & GYNECOLOGY

## 2019-09-11 PROCEDURE — 25000128 H RX IP 250 OP 636: Performed by: ANESTHESIOLOGY

## 2019-09-11 PROCEDURE — 0KQM0ZZ REPAIR PERINEUM MUSCLE, OPEN APPROACH: ICD-10-PCS | Performed by: OBSTETRICS & GYNECOLOGY

## 2019-09-11 PROCEDURE — 72200001 ZZH LABOR CARE VAGINAL DELIVERY SINGLE

## 2019-09-11 PROCEDURE — 84112 EVAL AMNIOTIC FLUID PROTEIN: CPT | Performed by: OBSTETRICS & GYNECOLOGY

## 2019-09-11 PROCEDURE — 86850 RBC ANTIBODY SCREEN: CPT | Performed by: OBSTETRICS & GYNECOLOGY

## 2019-09-11 PROCEDURE — 3E0R3BZ INTRODUCTION OF ANESTHETIC AGENT INTO SPINAL CANAL, PERCUTANEOUS APPROACH: ICD-10-PCS | Performed by: ANESTHESIOLOGY

## 2019-09-11 PROCEDURE — 86780 TREPONEMA PALLIDUM: CPT | Performed by: OBSTETRICS & GYNECOLOGY

## 2019-09-11 PROCEDURE — 27110038 ZZH RX 271: Performed by: ANESTHESIOLOGY

## 2019-09-11 PROCEDURE — 25000125 ZZHC RX 250: Performed by: OBSTETRICS & GYNECOLOGY

## 2019-09-11 RX ORDER — IBUPROFEN 400 MG/1
800 TABLET, FILM COATED ORAL
Status: DISCONTINUED | OUTPATIENT
Start: 2019-09-11 | End: 2019-09-13 | Stop reason: HOSPADM

## 2019-09-11 RX ORDER — ROPIVACAINE HYDROCHLORIDE 2 MG/ML
10 INJECTION, SOLUTION EPIDURAL; INFILTRATION; PERINEURAL ONCE
Status: COMPLETED | OUTPATIENT
Start: 2019-09-11 | End: 2019-09-11

## 2019-09-11 RX ORDER — LANOLIN 100 %
OINTMENT (GRAM) TOPICAL
Status: DISCONTINUED | OUTPATIENT
Start: 2019-09-11 | End: 2019-09-13 | Stop reason: HOSPADM

## 2019-09-11 RX ORDER — ACETAMINOPHEN 325 MG/1
650 TABLET ORAL EVERY 4 HOURS PRN
Status: DISCONTINUED | OUTPATIENT
Start: 2019-09-11 | End: 2019-09-13 | Stop reason: HOSPADM

## 2019-09-11 RX ORDER — SODIUM CHLORIDE, SODIUM LACTATE, POTASSIUM CHLORIDE, CALCIUM CHLORIDE 600; 310; 30; 20 MG/100ML; MG/100ML; MG/100ML; MG/100ML
INJECTION, SOLUTION INTRAVENOUS CONTINUOUS
Status: DISCONTINUED | OUTPATIENT
Start: 2019-09-11 | End: 2019-09-13 | Stop reason: HOSPADM

## 2019-09-11 RX ORDER — LEVOTHYROXINE SODIUM 25 UG/1
25 TABLET ORAL
Status: DISCONTINUED | OUTPATIENT
Start: 2019-09-11 | End: 2019-09-13 | Stop reason: HOSPADM

## 2019-09-11 RX ORDER — OXYTOCIN/0.9 % SODIUM CHLORIDE 30/500 ML
100 PLASTIC BAG, INJECTION (ML) INTRAVENOUS CONTINUOUS
Status: DISCONTINUED | OUTPATIENT
Start: 2019-09-11 | End: 2019-09-13 | Stop reason: HOSPADM

## 2019-09-11 RX ORDER — HYDROCORTISONE 2.5 %
CREAM (GRAM) TOPICAL 3 TIMES DAILY PRN
Status: DISCONTINUED | OUTPATIENT
Start: 2019-09-11 | End: 2019-09-13 | Stop reason: HOSPADM

## 2019-09-11 RX ORDER — ROPIVACAINE HYDROCHLORIDE 2 MG/ML
10 INJECTION, SOLUTION EPIDURAL; INFILTRATION; PERINEURAL ONCE
Status: DISCONTINUED | OUTPATIENT
Start: 2019-09-11 | End: 2019-09-13 | Stop reason: HOSPADM

## 2019-09-11 RX ORDER — PRENATAL VIT/IRON FUM/FOLIC AC 27MG-0.8MG
1 TABLET ORAL DAILY
Status: DISCONTINUED | OUTPATIENT
Start: 2019-09-11 | End: 2019-09-13 | Stop reason: HOSPADM

## 2019-09-11 RX ORDER — OXYTOCIN/0.9 % SODIUM CHLORIDE 30/500 ML
100-340 PLASTIC BAG, INJECTION (ML) INTRAVENOUS CONTINUOUS PRN
Status: COMPLETED | OUTPATIENT
Start: 2019-09-11 | End: 2019-09-11

## 2019-09-11 RX ORDER — ALBUTEROL SULFATE 90 UG/1
1-2 AEROSOL, METERED RESPIRATORY (INHALATION) 2 TIMES DAILY PRN
Status: DISCONTINUED | OUTPATIENT
Start: 2019-09-11 | End: 2019-09-13 | Stop reason: HOSPADM

## 2019-09-11 RX ORDER — LIDOCAINE HYDROCHLORIDE AND EPINEPHRINE 15; 5 MG/ML; UG/ML
INJECTION, SOLUTION EPIDURAL PRN
Status: DISCONTINUED | OUTPATIENT
Start: 2019-09-11 | End: 2019-09-12 | Stop reason: HOSPADM

## 2019-09-11 RX ORDER — OXYTOCIN 10 [USP'U]/ML
10 INJECTION, SOLUTION INTRAMUSCULAR; INTRAVENOUS
Status: DISCONTINUED | OUTPATIENT
Start: 2019-09-11 | End: 2019-09-13 | Stop reason: HOSPADM

## 2019-09-11 RX ORDER — ONDANSETRON 2 MG/ML
4 INJECTION INTRAMUSCULAR; INTRAVENOUS EVERY 6 HOURS PRN
Status: DISCONTINUED | OUTPATIENT
Start: 2019-09-11 | End: 2019-09-13 | Stop reason: HOSPADM

## 2019-09-11 RX ORDER — AMOXICILLIN 250 MG
2 CAPSULE ORAL 2 TIMES DAILY
Status: DISCONTINUED | OUTPATIENT
Start: 2019-09-11 | End: 2019-09-13 | Stop reason: HOSPADM

## 2019-09-11 RX ORDER — IBUPROFEN 400 MG/1
800 TABLET, FILM COATED ORAL EVERY 6 HOURS PRN
Status: DISCONTINUED | OUTPATIENT
Start: 2019-09-11 | End: 2019-09-13 | Stop reason: HOSPADM

## 2019-09-11 RX ORDER — OXYTOCIN/0.9 % SODIUM CHLORIDE 30/500 ML
1-24 PLASTIC BAG, INJECTION (ML) INTRAVENOUS CONTINUOUS
Status: DISCONTINUED | OUTPATIENT
Start: 2019-09-11 | End: 2019-09-13 | Stop reason: HOSPADM

## 2019-09-11 RX ORDER — NALOXONE HYDROCHLORIDE 0.4 MG/ML
.1-.4 INJECTION, SOLUTION INTRAMUSCULAR; INTRAVENOUS; SUBCUTANEOUS
Status: DISCONTINUED | OUTPATIENT
Start: 2019-09-11 | End: 2019-09-13 | Stop reason: HOSPADM

## 2019-09-11 RX ORDER — BISACODYL 10 MG
10 SUPPOSITORY, RECTAL RECTAL DAILY PRN
Status: DISCONTINUED | OUTPATIENT
Start: 2019-09-13 | End: 2019-09-13 | Stop reason: HOSPADM

## 2019-09-11 RX ORDER — FENTANYL CITRATE 50 UG/ML
50-100 INJECTION, SOLUTION INTRAMUSCULAR; INTRAVENOUS
Status: DISCONTINUED | OUTPATIENT
Start: 2019-09-11 | End: 2019-09-13 | Stop reason: HOSPADM

## 2019-09-11 RX ORDER — MISOPROSTOL 200 UG/1
800 TABLET ORAL ONCE
Status: COMPLETED | OUTPATIENT
Start: 2019-09-11 | End: 2019-09-11

## 2019-09-11 RX ORDER — ONDANSETRON 4 MG/1
4 TABLET, ORALLY DISINTEGRATING ORAL EVERY 6 HOURS PRN
Status: DISCONTINUED | OUTPATIENT
Start: 2019-09-11 | End: 2019-09-13 | Stop reason: HOSPADM

## 2019-09-11 RX ORDER — ROPIVACAINE HYDROCHLORIDE 2 MG/ML
INJECTION, SOLUTION EPIDURAL; INFILTRATION; PERINEURAL
Status: DISCONTINUED
Start: 2019-09-11 | End: 2019-09-11 | Stop reason: HOSPADM

## 2019-09-11 RX ORDER — LEVOTHYROXINE SODIUM 25 UG/1
25 TABLET ORAL DAILY
Status: DISCONTINUED | OUTPATIENT
Start: 2019-09-11 | End: 2019-09-11

## 2019-09-11 RX ORDER — NALBUPHINE HYDROCHLORIDE 10 MG/ML
2.5-5 INJECTION, SOLUTION INTRAMUSCULAR; INTRAVENOUS; SUBCUTANEOUS EVERY 6 HOURS PRN
Status: DISCONTINUED | OUTPATIENT
Start: 2019-09-11 | End: 2019-09-13 | Stop reason: HOSPADM

## 2019-09-11 RX ORDER — CARBOPROST TROMETHAMINE 250 UG/ML
250 INJECTION, SOLUTION INTRAMUSCULAR
Status: DISCONTINUED | OUTPATIENT
Start: 2019-09-11 | End: 2019-09-13 | Stop reason: HOSPADM

## 2019-09-11 RX ORDER — LIDOCAINE 40 MG/G
CREAM TOPICAL
Status: DISCONTINUED | OUTPATIENT
Start: 2019-09-11 | End: 2019-09-13 | Stop reason: HOSPADM

## 2019-09-11 RX ORDER — METHYLERGONOVINE MALEATE 0.2 MG/ML
200 INJECTION INTRAVENOUS
Status: DISCONTINUED | OUTPATIENT
Start: 2019-09-11 | End: 2019-09-13 | Stop reason: HOSPADM

## 2019-09-11 RX ORDER — AMOXICILLIN 250 MG
1 CAPSULE ORAL 2 TIMES DAILY
Status: DISCONTINUED | OUTPATIENT
Start: 2019-09-11 | End: 2019-09-13 | Stop reason: HOSPADM

## 2019-09-11 RX ORDER — LEVOTHYROXINE SODIUM 25 UG/1
25 TABLET ORAL DAILY
Status: ON HOLD | COMMUNITY
End: 2019-09-13

## 2019-09-11 RX ORDER — EPHEDRINE SULFATE 50 MG/ML
5 INJECTION, SOLUTION INTRAMUSCULAR; INTRAVENOUS; SUBCUTANEOUS
Status: DISCONTINUED | OUTPATIENT
Start: 2019-09-11 | End: 2019-09-13 | Stop reason: HOSPADM

## 2019-09-11 RX ORDER — OXYTOCIN/0.9 % SODIUM CHLORIDE 30/500 ML
340 PLASTIC BAG, INJECTION (ML) INTRAVENOUS CONTINUOUS PRN
Status: DISCONTINUED | OUTPATIENT
Start: 2019-09-11 | End: 2019-09-13 | Stop reason: HOSPADM

## 2019-09-11 RX ORDER — OXYCODONE AND ACETAMINOPHEN 5; 325 MG/1; MG/1
1 TABLET ORAL
Status: DISCONTINUED | OUTPATIENT
Start: 2019-09-11 | End: 2019-09-13 | Stop reason: HOSPADM

## 2019-09-11 RX ORDER — QUETIAPINE FUMARATE 50 MG/1
50 TABLET, FILM COATED ORAL AT BEDTIME
Status: DISCONTINUED | OUTPATIENT
Start: 2019-09-11 | End: 2019-09-13 | Stop reason: HOSPADM

## 2019-09-11 RX ADMIN — SENNOSIDES AND DOCUSATE SODIUM 1 TABLET: 8.6; 5 TABLET ORAL at 20:18

## 2019-09-11 RX ADMIN — Medication 12 ML/HR: at 07:36

## 2019-09-11 RX ADMIN — IBUPROFEN 800 MG: 400 TABLET ORAL at 20:18

## 2019-09-11 RX ADMIN — Medication 170 ML/HR: at 12:31

## 2019-09-11 RX ADMIN — SODIUM CHLORIDE, POTASSIUM CHLORIDE, SODIUM LACTATE AND CALCIUM CHLORIDE: 600; 310; 30; 20 INJECTION, SOLUTION INTRAVENOUS at 04:01

## 2019-09-11 RX ADMIN — ACETAMINOPHEN 650 MG: 325 TABLET ORAL at 20:18

## 2019-09-11 RX ADMIN — SODIUM CHLORIDE, POTASSIUM CHLORIDE, SODIUM LACTATE AND CALCIUM CHLORIDE: 600; 310; 30; 20 INJECTION, SOLUTION INTRAVENOUS at 07:09

## 2019-09-11 RX ADMIN — Medication 2 MILLI-UNITS/MIN: at 04:02

## 2019-09-11 RX ADMIN — ACETAMINOPHEN 650 MG: 325 TABLET ORAL at 14:30

## 2019-09-11 RX ADMIN — MISOPROSTOL 800 MCG: 200 TABLET ORAL at 12:42

## 2019-09-11 RX ADMIN — IBUPROFEN 800 MG: 400 TABLET ORAL at 14:29

## 2019-09-11 RX ADMIN — LEVOTHYROXINE SODIUM 25 MCG: 25 TABLET ORAL at 12:07

## 2019-09-11 RX ADMIN — ROPIVACAINE HYDROCHLORIDE 10 ML: 2 INJECTION, SOLUTION EPIDURAL; INFILTRATION at 07:31

## 2019-09-11 RX ADMIN — LIDOCAINE HYDROCHLORIDE AND EPINEPHRINE 3 ML: 15; 5 INJECTION, SOLUTION EPIDURAL at 07:28

## 2019-09-11 RX ADMIN — QUETIAPINE FUMARATE 50 MG: 50 TABLET ORAL at 21:49

## 2019-09-11 NOTE — ANESTHESIA PREPROCEDURE EVALUATION
Anesthesia Pre-Procedure Evaluation    Patient: Marialuisa Streeter   MRN: [id number] : 1985          Preoperative Diagnosis: * No surgery found *        Past Medical History:   Diagnosis Date     Anxiety      Asthma      Attention deficit disorder without mention of hyperactivity      Chronic insomnia      Depressive disorder      Depressive disorder, not elsewhere classified     DEPRESSION     Thyroid disease     hypothyroid     Past Surgical History:   Procedure Laterality Date     ENT SURGERY      T & A age 20     OPEN REDUCTION INTERNAL FIXATION RODDING INTRAMEDULLARY TIBIA Left 2017    Procedure: OPEN REDUCTION INTERNAL FIXATION RODDING INTRAMEDULLARY TIBIA;  OPEN REDUCTION INTERNAL FIXATION INTRAMEDULLARY RODDING LEFT MID SHAFT TIBIA FRACTURE;  Surgeon: Husam Schrader MD;  Location: [facility] OR       Anesthesia Evaluation     .             ROS/MED HX    ENT/Pulmonary:       Neurologic:       Cardiovascular:         METS/Exercise Tolerance:     Hematologic:         Musculoskeletal:         GI/Hepatic:         Renal/Genitourinary:         Endo:     (+) thyroid problem hypothyroidism, .      Psychiatric:     (+) psychiatric history anxiety and depression      Infectious Disease:         Malignancy:         Other:                                 Lab Results   Component Value Date    WBC 7.6 2017    HGB 13.4 2017    HCT 40.1 2017     2017     2017    POTASSIUM 3.9 2017    CHLORIDE 108 2017    CO2 29 2017    BUN 19 2017    CR 0.88 2017    GLC 80 2017    MAGGIE 9.4 2017    ALBUMIN 3.9 2017    PROTTOTAL 7.8 2017    ALT 20 2017    AST 22 2017    ALKPHOS 30 (L) 2017    BILITOTAL 0.5 2017    HCG Negative 2017       Preop Vitals  BP Readings from Last 3 Encounters:   19 125/77   17 111/73   17 118/76    Pulse Readings from Last 3 Encounters:   17 71  "  05/13/17 105   06/07/07 80      Resp Readings from Last 3 Encounters:   09/11/19 16   12/12/17 16   12/06/17 18    SpO2 Readings from Last 3 Encounters:   12/12/17 95%   12/06/17 100%   05/13/17 98%      Temp Readings from Last 1 Encounters:   09/11/19 36.8  C (98.2  F) (Temporal)    Ht Readings from Last 1 Encounters:   12/11/17 1.715 m (5' 7.5\")      Wt Readings from Last 1 Encounters:   12/11/17 90.7 kg (200 lb)    Estimated body mass index is 30.86 kg/m  as calculated from the following:    Height as of 12/11/17: 1.715 m (5' 7.5\").    Weight as of 12/11/17: 90.7 kg (200 lb).       Anesthesia Plan      History & Physical Review  History and physical reviewed and following examination; no interval change.    ASA Status:  2 .        Plan for Epidural          Postoperative Care      Consents  Anesthetic plan, risks, benefits and alternatives discussed with:  Patient..                 Jerry Veliz MD  "

## 2019-09-11 NOTE — ANESTHESIA PROCEDURE NOTES
Peripheral nerve/Neuraxial procedure note : epidural catheter  Pre-Procedure  Performed by Jamil Miranda MD  Location: OB      Pre-Anesthestic Checklist: patient identified, IV checked, risks and benefits discussed, informed consent, monitors and equipment checked, pre-op evaluation and at physician/surgeon's request    Timeout  Correct Patient: Yes   Correct Procedure: Yes   Correct Site: Yes   Correct Laterality: N/A   Correct Position: Yes   Site Marked: N/A   .   Procedure Documentation    .    Procedure:    Epidural catheter.  Insertion Site:L2-3  (midline approach) Injection technique: LORT saline   Local skin infiltrated with mL of 1% lidocaine.  VANESA at 5 cm     Patient Prep;mask, sterile gloves, povidone-iodine 7.5% surgical scrub, patient draped.  .  Needle: Touhy needle Needle Gauge: 17.    Needle Length (Inches) 5  # of attempts: 1 and # of redirects: : 0. .   Catheter: 19 G . .  Catheter threaded easily  5 cm epidural space.  10 cm at skin.   .    Assessment/Narrative  Paresthesias: No.  .  .  No aspiration negative for heme or CSF  . Test dose of 3 mL lidocaine 1.5% w/ 1:200,000 epinephrine at. Test dose negative for signs of intravascular, subdural or intrathecal injection. Comments:  Pt tolerated well.    Taped sterile and secure.   FHTs stable post-procedure.   No complications.     Catheter replaced 2 levels up. Sensory level at T12.

## 2019-09-11 NOTE — PLAN OF CARE
Data: Marialuisa Streeter transferred to [facility] via wheelchair at 1630. Baby transferred via parent's arms.  Action: Receiving unit notified of transfer: Yes. Patient and family notified of room change. Report given to Ruth FLORIAN RN at 1630. Belongings sent to receiving unit. Accompanied by Registered Nurse. Oriented patient to surroundings. Call light within reach.   Response: Patient tolerated transfer and is stable.

## 2019-09-11 NOTE — ANESTHESIA PROCEDURE NOTES
Peripheral nerve/Neuraxial procedure note : epidural catheter  Pre-Procedure  Performed by Jerry Veliz MD  Location: OB      Pre-Anesthestic Checklist: patient identified, IV checked, risks and benefits discussed, informed consent, monitors and equipment checked, pre-op evaluation and at physician/surgeon's request    Timeout  Correct Patient: Yes   Correct Procedure: Yes   Correct Site: Yes   Correct Laterality: N/A   Correct Position: Yes   Site Marked: N/A   .   Procedure Documentation    .    Procedure:    Epidural catheter.  Insertion Site:L3-4  (midline approach) Injection technique: LORT saline   Local skin infiltrated with 3 mL of 1% lidocaine.  VANESA at 5 cm     Patient Prep;mask, sterile gloves, povidone-iodine 7.5% surgical scrub, patient draped.  .  Needle: Touhy needle Needle Gauge: 17.    Needle Length (Inches) 3.5  # of attempts: 1 and # of redirects:  .   Catheter: 19 G . .  Catheter threaded easily  .  9 cm at skin.   .    Assessment/Narrative  Paresthesias: No.  .  .  Aspiration negative for heme or CSF  . Test dose of 3 mL lidocaine 1.5% w/ 1:200,000 epinephrine at. Test dose negative for signs of intravascular, subdural or intrathecal injection. Comments:  No complications   Secured with Tegaderm, adhesive spray and tape

## 2019-09-11 NOTE — PLAN OF CARE
39+5  presents to the MAC for a fluid leak.  Monitors applied with verbal consent.  Admission database completed.  VSS except high blood pressures.  Patient denies any headache, visual disturbances, or epigastric pain.  Patient states her leaking of fluid started around at 11:15 pm tonight and has been clear in color.  ROM plus sent.  SVE 2-3/60%/-2.  Dr. Huff updated.  Orders received to admit patient.  Patient transferred to room 219.  Report given to Stacie HAILE RN who assumes cares.

## 2019-09-11 NOTE — L&D DELIVERY NOTE
Admission date: 9/10/2019  Delivery date:  19  Place of delivery: [facility]    ADMITTING DIAGNOSIS:term IUP at 39+5 wks, ROM    PROCEDURES:NST, pitocin augmentation,     HISTORY: The patient is a 34 year old  at 39w5d  wks gestation admitted to labor and delivery with ROM at home for clear fluid.  Her pregnancy was complicated by ADD/anxiety on meds. She had excessive wt gain and was diagnosed with hypothyroidism during the pregnancy.  Her  labs showed blood type A positive , antibody screen was negative, rubella immune,  Hepatitis B negative, syphilis negative, and her group B strep screen was negative.      The patient was admitted. She received an epidural for pain control. She did require a small amount of pitocin augmentation. She progressed to complete by 1030 hours.     The patient began pushing at 1135 hours and delivered a viable male infant at 1228 hours, with Apgars of 9 and 9 at 1 and 5 minutes respectively. The infant weighed 8 pounds 13 ounces.  Delayed cord clamping was performed.  A nuchal cord was reduced prior to delivery of the shoulder and body.     The placenta was delivered intact with a 3-vessel cord. There was uterine atony which responded to pitocin in IV fluids, 800 micrograms cytotec placed per rectum and the bladder was emptied. Examination of the cervix and vagina revealed no evidence of lacerations. A second-degree perineal laceration was repaired with 3-0 Vicryl in a routine fashion. QBL was performed.  Mom and baby are stable postpartum.

## 2019-09-11 NOTE — PLAN OF CARE
Spon vaginal delivery at 1228 with Dr. Gaines. Delivery of a viable male at 1228. Spon lusty cry noted. Apgars 9/9. See delivery for details. Epidural in place and pain managed during labor. Pt tolerated delivery well. VS within normal limits. Bleeding increased after delivery of placenta Dr. Gaines administered 800mcg of misoprostol rectally. Placenta intact. .

## 2019-09-11 NOTE — H&P
[facility] Labor and Delivery History and Physical    Marialuisa Streeter MRN# [id number]   Age: 34 year old YOB: 1985     Date of Admission:  9/10/2019    Primary care provider: [facility], [facility] [place]           Chief Complaint:   Marialuisa Streeter is a 34 year old female who is 39w5d pregnant and being admitted for SROM.          Pregnancy history:     OBSTETRIC HISTORY:    OB History    Para Term  AB Living   1 0 0 0 0 0   SAB TAB Ectopic Multiple Live Births   0 0 0 0 0      # Outcome Date GA Lbr Kleber/2nd Weight Sex Delivery Anes PTL Lv   1 Current                EDC: Estimated Date of Delivery: Sep 13, 2019    Prenatal Labs:   Lab Results   Component Value Date    ABO A 2019    RH Pos 2019    AS Neg 2019    HEPBANG Negative 2019    CHPCRT  2008     Negative for C. trachomatis rRNA by transcription mediated amplification.   A negative result by transcription mediated amplification does not preclude the   presence of C. trachomatis infection because results are dependent on proper   and adequate collection, absence of inhibitors, and sufficient rRNA to be   detected.   A negative urine result for a female patient who is clinically suspected of   having a chlamydial infection does not rule out the presence of C. trachomatis   in the urogenital tract.    HGB 13.4 2017       GBS Status:   Lab Results   Component Value Date    GBS Negative 2019       Active Problem List  Patient Active Problem List   Diagnosis     Depression     Tibia/fibula fracture, shaft     Indication for care in labor or delivery      (normal spontaneous vaginal delivery)       Medication Prior to Admission  Medications Prior to Admission   Medication Sig Dispense Refill Last Dose     albuterol (PROAIR HFA/PROVENTIL HFA/VENTOLIN HFA) 108 (90 BASE) MCG/ACT Inhaler Inhale 1-2 puffs into the lungs 2 times daily as needed for shortness of  breath / dyspnea or wheezing   Past Week at Unknown time     levothyroxine (SYNTHROID/LEVOTHROID) 25 MCG tablet Take 25 mcg by mouth daily   9/10/2019 at 0800     Prenatal Vit-Fe Fumarate-FA (PRENATAL MULTIVITAMIN W/IRON) 27-0.8 MG tablet Take 1 tablet by mouth daily   9/10/2019 at 0800     QUEtiapine (SEROQUEL) 50 MG tablet Take 50 mg by mouth At Bedtime    9/10/2019 at Unknown time   .        Maternal Past Medical History:     Past Medical History:   Diagnosis Date     Anxiety      Asthma      Attention deficit disorder without mention of hyperactivity      Chronic insomnia      Depressive disorder      Depressive disorder, not elsewhere classified     DEPRESSION     Thyroid disease     hypothyroid                       Family History:   I have reviewed this patient's family history            Social History:     Social History     Tobacco Use     Smoking status: Former Smoker     Packs/day: 0.50     Smokeless tobacco: Former User     Tobacco comment: July 2017   Substance Use Topics     Alcohol use: Not Currently     Comment: 1 drink a week            Review of Systems:   C: NEGATIVE for fever, chills, change in weight  E/M: NEGATIVE for ear, mouth and throat problems  R: NEGATIVE for significant cough or SOB  CV: NEGATIVE for chest pain, palpitations or peripheral edema          Physical Exam:   Vitals were reviewed    Constitutional: Awake, alert, cooperative, no apparent distress, and appears stated age.  Eyes: Lids and lashes normal, pupils equal, round and reactive to light, extra ocular muscles intact, sclera clear, conjunctiva normal.  ENT: Normocephalic, without obvious abnormality, atramatic, sinuses nontender on palpation, external ears without lesions, oral pharynx with moist mucus membranes, tonsils without erythema or exudates, gums normal and good dentition.  Neck: Supple, symmetrical, trachea midline, no adenopathy, thyroid symmetric, not enlarged and no tenderness, skin normal.  Hematologic /  Lymphatic: No cervical lymphadenopathy and no supraclavicular lymphadenopathy.  Back: Symmetric, no curvature, spinous processes are non-tender on palpation, paraspinous muscles are non-tender on palpation, no costal vertebral tenderness.  Lungs: No increased work of breathing, good air exchange, clear to auscultation bilaterally, no crackles or wheezing.  Cardiovascular: Regular rate and rhythm, normal S1 and S2, no S3 or S4, and no murmur noted.  Chest / Breast: Breasts symmetrical, skin without lesion(s), no nipple retraction or dimpling, no nipple discharge, no masses palpated, no axillary or supraclavicular adenopathy.  Abdomen: No scars, normal bowel sounds, soft, non-distended, non-tender, no masses palpated, no hepatosplenomegally.  Genitourinary: No urethral discharge, normal external genitalia, no hernia.  Musculoskeletal: No redness, warmth, or swelling of the joints.  Full range of motion noted.  Motor strength is 5 out of 5 all extremities bilaterally.  Tone is normal.  Neurologic: Awake, alert, oriented to name, place and time.  Cranial nerves II-XII are grossly intact.  Motor is 5 out of 5 bilaterally.  Cerebellar finger to nose, heel to shin intact.  Sensory is intact.  Babinski down going, Romberg negative, and gait is normal.  Neuropsychiatric: Normal affect, mood, orientation, memory and insight.  Skin: No rashes, erythema, pallor, petechia or purpura.     Cervix:   Membranes: SROM   Dilation: 5   Effacement: 50%   Station:-3    Presentation:Cephalic  Fetal Heart Rate Tracing: Tier 1 (normal)  Tocometer: external monitor                       Assessment:   Marialuisa Streeter is a 39w5d pregnant female admitted with SROM.          Plan:   Admit - see IP orders    Lorin Gaines MD

## 2019-09-11 NOTE — PLAN OF CARE
Dr. Larson called and informed of elevated blood pressures. Dr. Gaines would like us to continue to observe blood pressures for the next hour. Pt denies any headache or visual disturbances

## 2019-09-11 NOTE — PLAN OF CARE
0059:SBAR/bedside report from Nafisa HILL RN. Will assume all cares for this patient. External monitors applied.  0115:Saline lock started in left hand. Patient tolerated well. Patient and mother oriented to room, call light and plan of care.  0200:Patient and mother resting in room.  0300:SBAR report to Avinash MESSINA RN/Clarice MERA RN to assume all cares for this patient.

## 2019-09-11 NOTE — PROVIDER NOTIFICATION
09/11/19 0034   Provider Notification   Provider Name/Title Dr. Huff   Method of Notification Phone   Request Evaluate - Remote   Notification Reason Patient Arrived;Membrane Status;Labor Status;Uterine Activity;Pain;Status Update;SVE   Orders received to admit patient.  Plan to start Pitocin at 0300 if SVE unchanged.

## 2019-09-11 NOTE — PLAN OF CARE
Patient crying out with all contractions, breathing reviewed but still not relaxing. Dr. Huff in department and updated, okay to get patient epidural.  0630 SVE done, 3 cms, 90%, -2 station. Patient did not tolerate exam, cried out and clamped legs together. Wants epidural now , fluid bolus started. Epidural procedure reviewed. Has no questions.  0700 patient up to void.  0710 positioned on edge of bed. Patient continues to cry out with contractions. Repot to Zofia Hyatt RN. Who assumed care  0715- Dr. Veliz updated and coming to place epidural.

## 2019-09-11 NOTE — PLAN OF CARE
0735 Dr. Veliz at bedside for epidural placement timeout performed pt in sitting position. Tolerated procedure well. 0740 epidural in place and infusing at 12cc per hour per pump pt in supine position with a left tilt. 0750 Rodney cath placed. 0820 Dr. Gaines updated. Pt continuous to have no pain relief from epidural. 0830 MDA called to evaluate epidural. 0835 epidural replaced. 0845 pt having relief of pain from epidural.

## 2019-09-12 LAB — HGB BLD-MCNC: 10.7 G/DL (ref 11.7–15.7)

## 2019-09-12 PROCEDURE — 12000035 ZZH R&B POSTPARTUM

## 2019-09-12 PROCEDURE — 25000132 ZZH RX MED GY IP 250 OP 250 PS 637: Performed by: OBSTETRICS & GYNECOLOGY

## 2019-09-12 PROCEDURE — 85018 HEMOGLOBIN: CPT | Performed by: OBSTETRICS & GYNECOLOGY

## 2019-09-12 PROCEDURE — 36415 COLL VENOUS BLD VENIPUNCTURE: CPT | Performed by: OBSTETRICS & GYNECOLOGY

## 2019-09-12 RX ADMIN — IBUPROFEN 800 MG: 400 TABLET ORAL at 02:30

## 2019-09-12 RX ADMIN — ACETAMINOPHEN 650 MG: 325 TABLET ORAL at 20:52

## 2019-09-12 RX ADMIN — IBUPROFEN 800 MG: 400 TABLET ORAL at 20:53

## 2019-09-12 RX ADMIN — SENNOSIDES AND DOCUSATE SODIUM 1 TABLET: 8.6; 5 TABLET ORAL at 20:54

## 2019-09-12 RX ADMIN — IBUPROFEN 800 MG: 400 TABLET ORAL at 09:17

## 2019-09-12 RX ADMIN — SENNOSIDES AND DOCUSATE SODIUM 2 TABLET: 8.6; 5 TABLET ORAL at 07:50

## 2019-09-12 RX ADMIN — LEVOTHYROXINE SODIUM 25 MCG: 25 TABLET ORAL at 07:50

## 2019-09-12 RX ADMIN — ACETAMINOPHEN 650 MG: 325 TABLET ORAL at 09:17

## 2019-09-12 RX ADMIN — QUETIAPINE FUMARATE 50 MG: 50 TABLET ORAL at 22:17

## 2019-09-12 RX ADMIN — PRENATAL VIT W/ FE FUMARATE-FA TAB 27-0.8 MG 1 TABLET: 27-0.8 TAB at 09:17

## 2019-09-12 RX ADMIN — ACETAMINOPHEN 650 MG: 325 TABLET ORAL at 02:30

## 2019-09-12 RX ADMIN — ACETAMINOPHEN 650 MG: 325 TABLET ORAL at 15:38

## 2019-09-12 RX ADMIN — IBUPROFEN 800 MG: 400 TABLET ORAL at 15:38

## 2019-09-12 NOTE — PLAN OF CARE
Patient's VSS.  Up ad michela.  Has pain with breastfeeding, taught how to encourage a wider latch.  Lanolin given.

## 2019-09-12 NOTE — PLAN OF CARE
Vital signs stable, assessment WNL. Pain controlled with Tylenol and ibuprofen; states satisfaction with pain management. Up in room independently with no complaints of dizziness, voiding without difficulty. Breastfeeding baby independently every 2-3 hours, using a nipple shield. Encouraged to call RN with needs, will continue to monitor.

## 2019-09-12 NOTE — PLAN OF CARE
VSS, room air. Fundus firm, midline, @ U. Bleeding scant. Up independently in room. Voided x2, IV out. Regular diet, tolerating well with no nausea. Stool softener given. Pain well-controlled with Tylenol and Ibuprofen. Using ice and tucks pads. Hx anxiety, taking Seroquel scheduled. Working on breastfeeding w/ shield. Encouraged to call with questions/needs/concerns. Will continue to monitor.

## 2019-09-12 NOTE — PROGRESS NOTES
[facility]       DAILY NOTE - POSTPARTUM DAY 1     SUBJECTIVE:     Pain controlled? Yes  Tolerating a regular diet? YES  Ambulating? YES  Voiding without difficulty? Yes    OBJECTIVE:  Vitals:    19 1630 19 2021 19 2353 19 0830   BP: 118/80 (!) 138/91 122/77 126/87   Pulse: 102 90     Resp: 16 16 16 16   Temp: 98.8  F (37.1  C) 98.4  F (36.9  C) 98.1  F (36.7  C) 98.2  F (36.8  C)   TempSrc: Axillary Oral Oral Oral   SpO2:           Constitutional: healthy, alert and no distress    Abdomen:  Uterine fundus is firm, non-tender and at the level of the umbilicus     extr tr edema      LABS:  Hemoglobin   Date Value Ref Range Status   2019 10.7 (L) 11.7 - 15.7 g/dL Final   2017 13.4 11.7 - 15.7 g/dL Final       ASSESSMENT:  Post-partum day #1 s/p   Pregnancy complicated by hypothyroid, excessive wt gain    Doing well.       PLAN:   Continue routine postpartum cares  Anticipate discharge tomorrow    Lorin Gaines MD

## 2019-09-13 VITALS
DIASTOLIC BLOOD PRESSURE: 78 MMHG | TEMPERATURE: 98 F | HEART RATE: 90 BPM | RESPIRATION RATE: 16 BRPM | OXYGEN SATURATION: 95 % | SYSTOLIC BLOOD PRESSURE: 126 MMHG

## 2019-09-13 PROCEDURE — 25000132 ZZH RX MED GY IP 250 OP 250 PS 637: Performed by: OBSTETRICS & GYNECOLOGY

## 2019-09-13 RX ORDER — IBUPROFEN 800 MG/1
800 TABLET, FILM COATED ORAL EVERY 6 HOURS PRN
COMMUNITY
Start: 2019-09-13

## 2019-09-13 RX ORDER — LEVOTHYROXINE SODIUM 25 UG/1
25 TABLET ORAL DAILY
Qty: 90 TABLET | Refills: 1 | Status: SHIPPED | OUTPATIENT
Start: 2019-09-13

## 2019-09-13 RX ORDER — AMOXICILLIN 250 MG
2 CAPSULE ORAL 2 TIMES DAILY
COMMUNITY
Start: 2019-09-13

## 2019-09-13 RX ORDER — ACETAMINOPHEN 325 MG/1
650 TABLET ORAL EVERY 4 HOURS PRN
COMMUNITY
Start: 2019-09-13

## 2019-09-13 RX ADMIN — PRENATAL VIT W/ FE FUMARATE-FA TAB 27-0.8 MG 1 TABLET: 27-0.8 TAB at 09:25

## 2019-09-13 RX ADMIN — IBUPROFEN 800 MG: 400 TABLET ORAL at 02:47

## 2019-09-13 RX ADMIN — ACETAMINOPHEN 650 MG: 325 TABLET ORAL at 01:12

## 2019-09-13 RX ADMIN — SENNOSIDES AND DOCUSATE SODIUM 2 TABLET: 8.6; 5 TABLET ORAL at 09:24

## 2019-09-13 RX ADMIN — LEVOTHYROXINE SODIUM 25 MCG: 25 TABLET ORAL at 07:10

## 2019-09-13 RX ADMIN — IBUPROFEN 800 MG: 400 TABLET ORAL at 09:25

## 2019-09-13 RX ADMIN — ACETAMINOPHEN 650 MG: 325 TABLET ORAL at 09:24

## 2019-09-13 NOTE — PROGRESS NOTES
[facility]       DAILY NOTE - POSTPARTUM DAY 2     SUBJECTIVE:     Pain controlled? Yes  Tolerating a regular diet? YES  Ambulating? YES  Voiding without difficulty? Yes    OBJECTIVE:  Vitals:    19 0830 19 1611 19 0112 19 0743   BP: 126/87 131/81 139/85 126/78   BP Location:  Left arm     Pulse:       Resp: 16 18 18 16   Temp: 98.2  F (36.8  C) 98.1  F (36.7  C) 98  F (36.7  C) 98  F (36.7  C)   TempSrc: Oral Oral Oral Oral   SpO2:           Constitutional: healthy, alert and no distress    Abdomen:  Uterine fundus is firm, non-tender and at the level of the umbilicus         LABS:  Hemoglobin   Date Value Ref Range Status   2019 10.7 (L) 11.7 - 15.7 g/dL Final   2017 13.4 11.7 - 15.7 g/dL Final       ASSESSMENT:  Post-partum day #2 s/p   Pregnancy complicated by hypothyroidism and excessive weight gain    Doing well.       PLAN:   Discharge today.  Return to clinic in 6 weeks.   Continue routine postpartum cares    Nela Morgan PA-C

## 2019-09-13 NOTE — LACTATION NOTE
Routine visit. Marialuisa is discharging home today with her baby. She states breastfeeding is going well with a shield and denies questions or concerns regarding feedings. She has an appt tomorrow at her peds group as well as a lactation consult. Recommended she continue marking feeds, voids and stools on feeding log and use outpatient lactation resources as needed. Marialuisa appreciative of my visit.

## 2019-09-13 NOTE — CONSULTS
AAYUSH: Received call from bedside RN that pt car seat is . Pt expresses financial strain with affording a new one, already paid her sister for the one that is . Pt is currently on WIC, would have been eligible through her [place] however cannot obtain one in time frame needed. AAYUSH provided pt with a car seat through [employer] for discharge today. No further needs expressed during our visit.    MITZY Rogers, [facility]othe[facility] Psychiatric CenterSW  Daytime (8:00am-4:30pm): [phone]  After-Hours AAYUSH Pager (4:30pm-11:30pm): [phone]

## 2019-09-13 NOTE — PLAN OF CARE
Vs's, fundus firm with scant flow. Wearing tucks to leroy area. Pain managed with tylenol and ibuprofen. Breast feeding  well. Using nipple shield. Pt did start to have bleeding on right nipple. Pt provided with sore nipple shells and hydro gel, will follow up with lactation 19. Social work ordered placed due to pt having  car seat and some financial difficulty to obtain car seat pt is also single parent with father of the baby not being involved. Pt's parents are involved and very helpful. Pt see's therapist and psychiatrist who manages her Seroquel. plan to follow up with therapist in next couple weeks. Discharge instructions provided to patient, all questions answered at that time.

## 2019-09-13 NOTE — ANESTHESIA POSTPROCEDURE EVALUATION
Patient: Marialuisa Streeter    * No procedures listed *    Diagnosis:* No pre-op diagnosis entered *  Diagnosis Additional Information: No value filed.    Anesthesia Type:  No value filed.    Note:  Anesthesia Post Evaluation    Patient location during evaluation: Floor  Patient participation: Able to fully participate in evaluation     Anesthetic complications: None    Comments: Pt doing well.  Denies epidural-related complaints.        Last vitals:  Vitals:    09/11/19 2353 09/12/19 0830 09/12/19 1611   BP: 122/77 126/87 131/81   Pulse:      Resp: 16 16 18   Temp: 36.7  C (98.1  F) 36.8  C (98.2  F) 36.7  C (98.1  F)   SpO2:            Electronically Signed By: ODILIA VIGIL MD  September 12, 2019  10:10 PM

## 2019-09-13 NOTE — PLAN OF CARE
Vital signs stable, funduid firm, scant rubra flow, Patient is up ad michela, voiding adequately, pain well controlled with  medications given as prescribed. Encouraged to continue to ambulate as able and void frequently. Patient is bonding well with infant.

## 2020-03-22 ENCOUNTER — HEALTH MAINTENANCE LETTER (OUTPATIENT)
Age: 35
End: 2020-03-22

## 2021-01-15 ENCOUNTER — HEALTH MAINTENANCE LETTER (OUTPATIENT)
Age: 36
End: 2021-01-15

## 2021-04-06 ENCOUNTER — IMMUNIZATION (OUTPATIENT)
Dept: LAB | Facility: CLINIC | Age: 36
End: 2021-04-06
Payer: COMMERCIAL

## 2021-05-15 ENCOUNTER — HEALTH MAINTENANCE LETTER (OUTPATIENT)
Age: 36
End: 2021-05-15

## 2021-09-04 ENCOUNTER — HEALTH MAINTENANCE LETTER (OUTPATIENT)
Age: 36
End: 2021-09-04

## 2022-06-11 ENCOUNTER — HEALTH MAINTENANCE LETTER (OUTPATIENT)
Age: 37
End: 2022-06-11

## 2022-10-16 ENCOUNTER — HEALTH MAINTENANCE LETTER (OUTPATIENT)
Age: 37
End: 2022-10-16

## 2023-06-17 ENCOUNTER — HEALTH MAINTENANCE LETTER (OUTPATIENT)
Age: 38
End: 2023-06-17

## 2024-08-01 ENCOUNTER — LAB REQUISITION (OUTPATIENT)
Dept: LAB | Facility: CLINIC | Age: 39
End: 2024-08-01

## 2024-08-01 DIAGNOSIS — Z12.4 ENCOUNTER FOR SCREENING FOR MALIGNANT NEOPLASM OF CERVIX: ICD-10-CM

## 2024-08-01 PROCEDURE — G0145 SCR C/V CYTO,THINLAYER,RESCR: HCPCS | Performed by: OBSTETRICS & GYNECOLOGY

## 2024-08-01 PROCEDURE — 87624 HPV HI-RISK TYP POOLED RSLT: CPT | Performed by: OBSTETRICS & GYNECOLOGY

## 2024-08-06 LAB
HPV HR 12 DNA CVX QL NAA+PROBE: NEGATIVE
HPV16 DNA CVX QL NAA+PROBE: NEGATIVE
HPV18 DNA CVX QL NAA+PROBE: NEGATIVE
HUMAN PAPILLOMA VIRUS FINAL DIAGNOSIS: NORMAL

## 2024-08-07 LAB
BKR LAB AP GYN ADEQUACY: NORMAL
BKR LAB AP GYN INTERPRETATION: NORMAL
BKR LAB AP LMP: NORMAL
BKR LAB AP PREVIOUS ABNL DX: NORMAL
PATH REPORT.COMMENTS IMP SPEC: NORMAL
PATH REPORT.COMMENTS IMP SPEC: NORMAL
PATH REPORT.RELEVANT HX SPEC: NORMAL

## (undated) DEVICE — GLOVE PROTEXIS W/NEU-THERA 8.5  2D73TE85

## (undated) DEVICE — DRAPE SHEET REV FOLD 3/4 9349

## (undated) DEVICE — GLOVE PROTEXIS W/NEU-THERA 7.5  2D73TE75

## (undated) DEVICE — GLOVE PROTEXIS W/NEU-THERA 8.0  2D73TE80

## (undated) DEVICE — PREP CHLORAPREP 26ML TINTED ORANGE  260815

## (undated) DEVICE — ESU GROUND PAD UNIVERSAL W/O CORD

## (undated) DEVICE — Device

## (undated) DEVICE — CAST PADDING 6" UNSTERILE 9046

## (undated) DEVICE — PACK TOTAL KNEE SOP15TKFSD

## (undated) DEVICE — TOURNIQUET CUFF 34" STERILE

## (undated) DEVICE — SOL NACL 0.9% IRRIG 1000ML BOTTLE 07138-09

## (undated) DEVICE — DRSG GAUZE 4X4" 3033

## (undated) DEVICE — BLADE CLIPPER 4412A

## (undated) DEVICE — LINEN TOWEL PACK X5 5464

## (undated) DEVICE — DRSG ABDOMINAL 07 1/2X8" 7197D

## (undated) DEVICE — DRILL BIT QUICK COUPLING 3 FLUTE 4.2MMX330/100MM CALIBRATE

## (undated) DEVICE — DRSG KERLIX FLUFFS X5

## (undated) DEVICE — DRSG ADAPTIC 3X8" 6113

## (undated) DEVICE — ROD SYN REAMER BALL TIP 2.5X950MM  351.706S

## (undated) RX ORDER — FENTANYL CITRATE 50 UG/ML
INJECTION, SOLUTION INTRAMUSCULAR; INTRAVENOUS
Status: DISPENSED
Start: 2017-12-11

## (undated) RX ORDER — HYDROMORPHONE HYDROCHLORIDE 1 MG/ML
INJECTION, SOLUTION INTRAMUSCULAR; INTRAVENOUS; SUBCUTANEOUS
Status: DISPENSED
Start: 2017-12-11

## (undated) RX ORDER — PROPOFOL 10 MG/ML
INJECTION, EMULSION INTRAVENOUS
Status: DISPENSED
Start: 2017-12-11

## (undated) RX ORDER — ONDANSETRON 2 MG/ML
INJECTION INTRAMUSCULAR; INTRAVENOUS
Status: DISPENSED
Start: 2017-12-11

## (undated) RX ORDER — DEXAMETHASONE SODIUM PHOSPHATE 4 MG/ML
INJECTION, SOLUTION INTRA-ARTICULAR; INTRALESIONAL; INTRAMUSCULAR; INTRAVENOUS; SOFT TISSUE
Status: DISPENSED
Start: 2017-12-11

## (undated) RX ORDER — CLINDAMYCIN PHOSPHATE 900 MG/50ML
INJECTION, SOLUTION INTRAVENOUS
Status: DISPENSED
Start: 2017-12-11